# Patient Record
Sex: MALE | Race: AMERICAN INDIAN OR ALASKA NATIVE | Employment: FULL TIME | URBAN - METROPOLITAN AREA
[De-identification: names, ages, dates, MRNs, and addresses within clinical notes are randomized per-mention and may not be internally consistent; named-entity substitution may affect disease eponyms.]

---

## 2017-01-23 ENCOUNTER — HOSPITAL ENCOUNTER (OUTPATIENT)
Dept: PREADMISSION TESTING | Age: 57
Discharge: HOME | End: 2017-01-23
Attending: ORTHOPAEDIC SURGERY
Payer: COMMERCIAL

## 2017-01-23 DIAGNOSIS — M75.122 COMPLETE ROTATOR CUFF TEAR OF LEFT SHOULDER: ICD-10-CM

## 2017-01-23 DIAGNOSIS — Z01.818 PREOP EXAMINATION: ICD-10-CM

## 2017-01-23 DIAGNOSIS — M75.42 IMPINGEMENT SYNDROME OF LEFT SHOULDER: ICD-10-CM

## 2017-01-23 DIAGNOSIS — Z01.812 BLOOD TESTS PRIOR TO TREATMENT OR PROCEDURE: ICD-10-CM

## 2017-01-23 LAB
ATRIAL RATE: 78 BPM
CALCULATED P AXIS, ECG09: 63 DEGREES
CALCULATED R AXIS, ECG10: 42 DEGREES
CALCULATED T AXIS, ECG11: 52 DEGREES
DIAGNOSIS, 93000: NORMAL
HCT VFR BLD AUTO: 42 % (ref 36–48)
HGB BLD-MCNC: 14 G/DL (ref 13–16)
P-R INTERVAL, ECG05: 164 MS
POTASSIUM SERPL-SCNC: 3.3 MMOL/L (ref 3.5–5.5)
Q-T INTERVAL, ECG07: 408 MS
QRS DURATION, ECG06: 94 MS
QTC CALCULATION (BEZET), ECG08: 465 MS
VENTRICULAR RATE, ECG03: 78 BPM

## 2017-01-23 PROCEDURE — 84132 ASSAY OF SERUM POTASSIUM: CPT | Performed by: ORTHOPAEDIC SURGERY

## 2017-01-23 PROCEDURE — 93005 ELECTROCARDIOGRAM TRACING: CPT

## 2017-01-23 PROCEDURE — 36415 COLL VENOUS BLD VENIPUNCTURE: CPT | Performed by: ORTHOPAEDIC SURGERY

## 2017-01-23 PROCEDURE — 85018 HEMOGLOBIN: CPT | Performed by: ORTHOPAEDIC SURGERY

## 2017-01-24 LAB
FAX TO INFO,FAXT: NORMAL
FAX TO NUMBER,FAXN: NORMAL

## 2017-02-08 ENCOUNTER — HOSPITAL ENCOUNTER (OUTPATIENT)
Dept: PHYSICAL THERAPY | Age: 57
Discharge: HOME | End: 2017-02-08
Payer: COMMERCIAL

## 2017-02-08 PROCEDURE — 97110 THERAPEUTIC EXERCISES: CPT

## 2017-02-08 PROCEDURE — 97162 PT EVAL MOD COMPLEX 30 MIN: CPT

## 2017-02-08 NOTE — PROGRESS NOTES
PT DAILY TREATMENT NOTE 12    Patient Name: Jessie Webster  Date:2017  : 1960  [x]  Patient  Verified  Payor: BLUE CROSS / Plan: [email or web address] [facility] [place] / Product Type: PPO /    In time:3:35  Out time:4:14  Total Treatment Time (min): 39  Visit #: 1 of 10    Treatment Area: Pain in left shoulder [M25.512]    SUBJECTIVE  Pain Level (0-10 scale): 5-6  Any medication changes, allergies to medications, adverse drug reactions, diagnosis change, or new procedure performed?: [x] No    [] Yes (see summary sheet for update)  Subjective functional status/changes:   [] No changes reported  Pt is a 59year old male who presents to therapy today s/p left shoulder arthroscope [employer] [facility] DOS 2017. Pt was seen for therapy in early-mid  after having right rotator cuff repair surgery. Pt reports no complications with the surgery.  Pt reports increased pain and stiffness in the left UE.     OBJECTIVE    Modality rationale: decrease edema, decrease inflammation and decrease pain to improve the patients ability to tolerate ADLs   Min Type Additional Details    [] Estim:  []Unatt       []IFC  []Premod                        []Other:  []w/ice   []w/heat  Position:  Location:    [] Estim: []Att    []TENS instruct  []NMES                    []Other:  []w/US   []w/ice   []w/heat  Position:  Location:    []  Traction: [] Cervical       []Lumbar                       [] Prone          []Supine                       []Intermittent   []Continuous Lbs:  [] before manual  [] after manual    []  Ultrasound: []Continuous   [] Pulsed                           []1MHz   []3MHz W/cm2:  Location:    []  Iontophoresis with dexamethasone         Location: [] Take home patch   [] In clinic   10 [x]  Ice     []  heat  []  Ice massage  []  Laser   []  Anodyne Position: sitting  Location: left shoulder    []  Laser with stim  []  Other:  Position:  Location:    []  Vasopneumatic Device Pressure:       [] lo [] med [] hi   Temperature: [] lo [] med [] hi   [] Skin assessment post-treatment:  []intact []redness- no adverse reaction    []redness  adverse reaction:     21 min [x]Eval                  []Re-Eval     8 min Therapeutic Exercise:  [] See flow sheet : HEP instruction, pt education regarding anatomy and physiology of the UEs and how it relates to the pt's condition. Rationale: increase ROM and increase strength to improve the patients ability to tolerate ADLs          With   [] TE   [] TA   [] neuro   [] other: Patient Education: [x] Review HEP    [] Progressed/Changed HEP based on:   [] positioning   [] body mechanics   [] transfers   [] heat/ice application    [] other:      Other Objective/Functional Measures: See evaluation     Pain Level (0-10 scale) post treatment: 8-9    ASSESSMENT/Changes in Function: Pt given HEP handout to perform. Pt understood exercises in HEP handout. Educated pt to ice his shoulder for 10-15 mins at a time to improve pain and decrease swelling. Pt demonstrated decreased and painful PROM, impaired supine to/from sit transfers secondary to pain, and muscle tightness. Therapist was unable to measure left shoulder ER/IR PROM secondary to increased pain today. Increased pain reported with PROM left shoulder flex and therapist able to achieve ~20 degs PROM of this motion because of the increased pain. Pt reports he did not take any pain medication before therapy and last took pain medication this morning, which could have affected pt's PROM measurements. Pt would benefit from physical therapy to improve the above impairments to help the pt return to performing ADLs, functional, work and recreational activities.      Patient will continue to benefit from skilled PT services to modify and progress therapeutic interventions, address functional mobility deficits, address ROM deficits, address strength deficits, analyze and address soft tissue restrictions, analyze and cue movement patterns, analyze and modify body mechanics/ergonomics, assess and modify postural abnormalities and instruct in home and community integration to attain remaining goals. [x]  See Plan of Care  []  See progress note/recertification  []  See Discharge Summary         Progress towards goals / Updated goals:  Short Term Goals: To be accomplished in 1 weeks:  1. Pt will report compliance and independence to HEP to help the pt manage their pain and symptoms. Eval: established  Long Term Goals: To be accomplished in 5 weeks:  1. Pt will increase FOTO score to 56 points to improve ability to perform ADLs. Eval: 16 points  2. Pt will report a decrease in at worst pain to 8/10 and at best pain to 4/10 to improve activity tolerance. Eval: at worst 10/10, at best 6/10  3. Pt will increase PROM left shoulder flex to at least 90 degs, ER (at neutral ABD) to 30 degs, IR (at neutral ABD) to 50 degs to improve ability to progress through protocol with more ease. Eval: PROM flex ~20 degs, ER an IR unable to measure secondary to increased pain during evaluation.      PLAN  [x]  Upgrade activities as tolerated     [x]  Continue plan of care  [x]  Update interventions per flow sheet       []  Discharge due to:_  []  Other:_      Arlen Vazquez, PT 2/8/2017  3:50 PM    Future Appointments  Date Time Provider Neli Ragsdale   2/8/2017 3:30 PM Arlen Vazquez, PT [facility]

## 2017-02-08 NOTE — PROGRESS NOTES
[facility] at [address] [facility] [address] [facility] [facility], [address] [place] [address]  Phone: [phone]      Fax:  [phone]    Plan of Care/ Statement of Necessity for Physical Therapy Services  Patient name: Maryam De La Cruz Start of Care: 2017   Referral source: Adarsh Cao MD : 1960    Medical Diagnosis: Pain in left shoulder [M25.512]   Onset Date:DOS 2017    Treatment Diagnosis: pain in left shoulder s/p left shoulder arthroscope SAD, RCR   Prior Hospitalization: see medical history Provider#: [id number]   Medications: Verified on Patient summary List    Comorbidities: right RCR repair 3/8/2016, left TKR , HTN, arthritis   Prior Level of Function: Independent with ADLs, functional, and work tasks with pain in the left shoulder before the surgery. The Plan of Care and following information is based on the information from the initial evaluation. Assessment/ key information:   Pt is a 59year old male who presents to therapy today s/p left shoulder arthroscope SAD, RCR DOS 2017. Pt was seen for therapy in early-mid  after having right rotator cuff repair surgery. Pt reports no complications with the surgery. Pt reports increased pain and stiffness in the left UE. Pt demonstrated decreased and painful PROM, impaired supine to/from sit transfers secondary to pain, and muscle tightness. Therapist was unable to measure left shoulder ER/IR PROM secondary to increased pain today. Increased pain reported with PROM left shoulder flex and therapist able to achieve ~20 degs PROM of this motion because of the increased pain. Pt reports he did not take any pain medication before therapy and last took pain medication this morning, which could have affected pt's PROM measurements. Pt would benefit from physical therapy to improve the above impairments to help the pt return to performing ADLs, functional, work and recreational activities.      Evaluation Complexity History MEDIUM  Complexity : 1-2 comorbidities / personal factors will impact the outcome/ POC ; Examination LOW Complexity : 1-2 Standardized tests and measures addressing body structure, function, activity limitation and / or participation in recreation  ;Presentation LOW Complexity : Stable, uncomplicated  ;Clinical Decision Making HIGH Complexity : FOTO score of 1- 25   Overall Complexity Rating: MEDIUM  Problem List: pain affecting function, decrease ROM, decrease strength, edema affecting function, decrease ADL/ functional abilitiies, decrease activity tolerance, decrease flexibility/ joint mobility and decrease transfer abilities   Treatment Plan may include any combination of the following: Therapeutic exercise, Therapeutic activities, Neuromuscular re-education, Physical agent/modality, Manual therapy, Patient education, Self Care training, Functional mobility training and Home safety training  Patient / Family readiness to learn indicated by: asking questions, trying to perform skills and interest  Persons(s) to be included in education: patient (P) and family support person (FSP);list pt's wife  Barriers to Learning/Limitations: None  Patient Goal (s): complete healing  Patient Self Reported Health Status: good  Rehabilitation Potential: good    Short Term Goals: To be accomplished in 1 weeks:  1. Pt will report compliance and independence to HEP to help the pt manage their pain and symptoms. Long Term Goals: To be accomplished in 5 weeks:  1. Pt will increase FOTO score to 56 points to improve ability to perform ADLs. 2. Pt will report a decrease in at worst pain to 8/10 and at best pain to 4/10 to improve activity tolerance. 3. Pt will increase PROM left shoulder flex to at least 90 degs, ER (at neutral ABD) to 30 degs, IR (at neutral ABD) to 50 degs to improve ability to progress through protocol with more ease. Frequency / Duration: Patient to be seen 2 times per week for 5 weeks.     Patient/ Caregiver education and instruction: Diagnosis, prognosis, self care, activity modification, exercises and other sling application   [x]  Plan of care has been reviewed with ERIN Saunders, PT 2/8/2017 3:50 PM  _____________________________________________________________________  I certify that the above Therapy Services are being furnished while the patient is under my care. I agree with the treatment plan and certify that this therapy is necessary.     Physician's Signature:____________________  Date:__________Time:______    Please sign and return to [facility] at [address] [facility] [facility], [address]  Phone: [phone]      Fax:  [phone]

## 2017-02-14 ENCOUNTER — HOSPITAL ENCOUNTER (OUTPATIENT)
Dept: PHYSICAL THERAPY | Age: 57
Discharge: HOME | End: 2017-02-14
Payer: COMMERCIAL

## 2017-02-14 PROCEDURE — 97140 MANUAL THERAPY 1/> REGIONS: CPT

## 2017-02-14 PROCEDURE — 97110 THERAPEUTIC EXERCISES: CPT

## 2017-02-14 NOTE — PROGRESS NOTES
PT DAILY TREATMENT NOTE     Patient Name: Millicent Chong  Date:2017  : 1960  [x]  Patient  Verified  Payor: Marcy Morena / Plan:  [facility] / Product Type: PPO /    In time:1:00  Out time:2:00  Total Treatment Time (min): 60  Visit #: 2 of 10    Treatment Area: Pain in left shoulder [M25.512]    SUBJECTIVE  Pain Level (0-10 scale): 3/10  Any medication changes, allergies to medications, adverse drug reactions, diagnosis change, or new procedure performed?: [x] No    [] Yes (see summary sheet for update)  Subjective functional status/changes:   [] No changes reported  \"It's okay. \"    OBJECTIVE    Modality rationale: decrease inflammation and decrease pain to improve the patients ability to perform ADLs without difficulty. Min Type Additional Details    [] Estim:  []Unatt       []IFC  []Premod                        []Other:  []w/ice   []w/heat  Position:  Location:    [] Estim: []Att    []TENS instruct  []NMES                    []Other:  []w/US   []w/ice   []w/heat  Position:  Location:    []  Traction: [] Cervical       []Lumbar                       [] Prone          []Supine                       []Intermittent   []Continuous Lbs:  [] before manual  [] after manual    []  Ultrasound: []Continuous   [] Pulsed                           []1MHz   []3MHz W/cm2:  Location:    []  Iontophoresis with dexamethasone         Location: [] Take home patch   [] In clinic   10 [x]  Ice     []  heat  []  Ice massage  []  Laser   []  Anodyne Position:seated  Location:left shoulder    []  Laser with stim  []  Other:  Position:  Location:    []  Vasopneumatic Device Pressure:       [] lo [] med [] hi   Temperature: [] lo [] med [] hi   [x] Skin assessment post-treatment:  [x]intact [x]redness- no adverse reaction    []redness  adverse reaction:     35 min Therapeutic Exercise:  [] See flow sheet :   Rationale: increase ROM to improve the patients ability to perform ADls without difficulty.     15 min Manual Therapy:  PROM into flexion. Left elbow flexion   Rationale: decrease pain and increase ROM to perform ADLs without difficulty. With   [] TE   [] TA   [] neuro   [] other: Patient Education: [x] Review HEP    [] Progressed/Changed HEP based on:   [] positioning   [] body mechanics   [] transfers   [] heat/ice application    [] other:      Other Objective/Functional Measures:      Pain Level (0-10 scale) post treatment: 2/10    ASSESSMENT/Changes in Function: Initiated ex. Per flow sheet. Pt reported some discomfort with manual but reported a decrease in p! After therapy. Patient will continue to benefit from skilled PT services to modify and progress therapeutic interventions, address functional mobility deficits, address ROM deficits, address strength deficits and analyze and address soft tissue restrictions to attain remaining goals. []  See Plan of Care  []  See progress note/recertification  []  See Discharge Summary         Progress towards goals / Updated goals:  Short Term Goals: To be accomplished in 1 weeks:  1. Pt will report compliance and independence to HEP to help the pt manage their pain and symptoms. Eval: established  Long Term Goals: To be accomplished in 5 weeks:  1. Pt will increase FOTO score to 56 points to improve ability to perform ADLs. Eval: 16 points  2. Pt will report a decrease in at worst pain to 8/10 and at best pain to 4/10 to improve activity tolerance. Eval: at worst 10/10, at best 6/10  3. Pt will increase PROM left shoulder flex to at least 90 degs, ER (at neutral ABD) to 30 degs, IR (at neutral ABD) to 50 degs to improve ability to progress through protocol with more ease.   Eval: PROM flex ~20 degs, ER an IR unable to measure secondary to increased pain during evaluation.        PLAN  [x]  Upgrade activities as tolerated     [x]  Continue plan of care  []  Update interventions per flow sheet       []  Discharge due to:_  []  Other:_      Anali Villela ERIN Huffman 2/14/2017  1:19 PM    Future Appointments  Date Time Provider Neli Melyssa   2/16/2017 3:00 PM Rickynaty Muñoz, PTA [facility]   2/21/2017 3:00 PM Ricky Toni, PTA [facility]   2/23/2017 3:00 PM Shabbir Ayala, PT [facility]   2/28/2017 3:30 PM Ricky Matthewua, PTA [facility]   3/7/2017 3:00 PM Ricky Matthewua, PTA [facility]   3/9/2017 3:00 PM Ricky Matthewua, PTA [facility]   3/14/2017 3:00 PM Ricky [place], PTA [facility]   3/16/2017 3:00 PM Ricky [place], PTA [facility]   3/21/2017 3:00 PM Ricky Chidevin[place]ua, PTA [facility]   3/23/2017 3:00 PM Ricky Chidevin[place]ua, PTA [facility]   3/28/2017 3:00 PM Ricky Chidevin[place]ua, PTA [facility]   3/30/2017 3:00 PM Ricky Mikal[place]ua, PTA [facility]

## 2017-02-16 ENCOUNTER — HOSPITAL ENCOUNTER (OUTPATIENT)
Dept: PHYSICAL THERAPY | Age: 57
Discharge: HOME | End: 2017-02-16
Payer: COMMERCIAL

## 2017-02-16 PROCEDURE — 97140 MANUAL THERAPY 1/> REGIONS: CPT

## 2017-02-16 PROCEDURE — 97110 THERAPEUTIC EXERCISES: CPT

## 2017-02-16 NOTE — PROGRESS NOTES
PT DAILY TREATMENT NOTE     Patient Name: Milton Bradley  Date:2017  : 1960  [x]  Patient  Verified  Payor: BLUE CROSS / Plan: [facility] / Product Type: PPO /    In time:3:00  Out time:3:50  Total Treatment Time (min): 50  Visit #: 3 of 10    Treatment Area: Pain in left shoulder [M25.512]    SUBJECTIVE  Pain Level (0-10 scale): 2/10  Any medication changes, allergies to medications, adverse drug reactions, diagnosis change, or new procedure performed?: [x] No    [] Yes (see summary sheet for update)  Subjective functional status/changes:   [] No changes reported  \"I was really sore after I left the other day. It's better today. \"    OBJECTIVE    Modality rationale: decrease inflammation and decrease pain to improve the patients ability to perform ADLs without difficulty.    Min Type Additional Details    [] Estim:  []Unatt       []IFC  []Premod                        []Other:  []w/ice   []w/heat  Position:  Location:    [] Estim: []Att    []TENS instruct  []NMES                    []Other:  []w/US   []w/ice   []w/heat  Position:  Location:    []  Traction: [] Cervical       []Lumbar                       [] Prone          []Supine                       []Intermittent   []Continuous Lbs:  [] before manual  [] after manual    []  Ultrasound: []Continuous   [] Pulsed                           []1MHz   []3MHz W/cm2:  Location:    []  Iontophoresis with dexamethasone         Location: [] Take home patch   [] In clinic   10 [x]  Ice     []  heat  []  Ice massage  []  Laser   []  Anodyne Position:seated  Location:left shoulder    []  Laser with stim  []  Other:  Position:  Location:    []  Vasopneumatic Device Pressure:       [] lo [] med [] hi   Temperature: [] lo [] med [] hi   [x] Skin assessment post-treatment:  [x]intact [x]redness- no adverse reaction    []redness  adverse reaction:     30 min Therapeutic Exercise:  [] See flow sheet :   Rationale: increase ROM to improve the patients ability to perform ADLs without difficulty. 10 min Manual Therapy:  PROM into flexion. Left elbow flexion   Rationale: decrease pain, increase ROM and increase tissue extensibility to perform ADLs without difficulty. With   [] TE   [] TA   [] neuro   [] other: Patient Education: [x] Review HEP    [] Progressed/Changed HEP based on:   [] positioning   [] body mechanics   [] transfers   [] heat/ice application    [] other:      Other Objective/Functional Measures:      Pain Level (0-10 scale) post treatment: 4/10    ASSESSMENT/Changes in Function: Continued with current ex. Per protocol. Pt continues to report increased p! With manual but is able to tolerate. PROM is limited by pain. Patient will continue to benefit from skilled PT services to modify and progress therapeutic interventions, address functional mobility deficits and address ROM deficits to attain remaining goals. []  See Plan of Care  []  See progress note/recertification  []  See Discharge Summary         Progress towards goals / Updated goals:  Short Term Goals: To be accomplished in 1 weeks:  1. Pt will report compliance and independence to HEP to help the pt manage their pain and symptoms. Eval: established  Current: MET. Pt reports compliance. Long Term Goals: To be accomplished in 5 weeks:  1. Pt will increase FOTO score to 56 points to improve ability to perform ADLs. Eval: 16 points  2. Pt will report a decrease in at worst pain to 8/10 and at best pain to 4/10 to improve activity tolerance. Eval: at worst 10/10, at best 6/10  3. Pt will increase PROM left shoulder flex to at least 90 degs, ER (at neutral ABD) to 30 degs, IR (at neutral ABD) to 50 degs to improve ability to progress through protocol with more ease. Eval: PROM flex ~20 degs, ER an IR unable to measure secondary to increased pain during evaluation.      PLAN  [x]  Upgrade activities as tolerated     [x]  Continue plan of care  []  Update interventions per flow sheet       []  Discharge due to:_  []  Other:_      Nellydomonique Betty ERIN 2/16/2017  4:50 PM    Future Appointments  Date Time Provider Neli Ragsdale   2/21/2017 3:00 PM Thomas Hernández, ERIN [facility]   2/23/2017 3:00 PM Laura Noel, PT [facility]   2/27/2017 3:30 PM Laura Noel, PT [facility]   3/2/2017 3:30 PM Thomas Hernández, ERIN [facility]   3/7/2017 3:30 PM Thomas Hernández, ERIN [facility]   3/9/2017 3:30 PM Thomas Hernández, ERIN [facility]   3/14/2017 3:30 PM Thomas Hernández, ERIN [facility]   3/16/2017 3:30 PM Thomas Hernández, ERIN [facility]   3/21/2017 3:30 PM Thomas Hernández, ERIN [facility]   3/23/2017 3:30 PM Thomas Hernández, ERIN [facility]   3/28/2017 3:30 PM Thomas Hernándze, ERIN [facility]   3/30/2017 3:30 PM Thomas Hernández, ERIN [facility]

## 2017-02-21 ENCOUNTER — HOSPITAL ENCOUNTER (OUTPATIENT)
Dept: PHYSICAL THERAPY | Age: 57
Discharge: HOME | End: 2017-02-21
Payer: COMMERCIAL

## 2017-02-21 PROCEDURE — 97110 THERAPEUTIC EXERCISES: CPT

## 2017-02-21 PROCEDURE — 97112 NEUROMUSCULAR REEDUCATION: CPT

## 2017-02-21 NOTE — PROGRESS NOTES
PT DAILY TREATMENT NOTE     Patient Name: Brenda Kidd  Date:2017  : 1960  [x]  Patient  Verified  Payor: BLUE CROSS / Plan:  [facility] [place] [place] / Product Type: PPO /    In time:2:55  Out time:3:35  Total Treatment Time (min): 40  Visit #: 4 of 10    Treatment Area: Pain in left shoulder [M25.512]    SUBJECTIVE  Pain Level (0-10 scale): 0/10  Any medication changes, allergies to medications, adverse drug reactions, diagnosis change, or new procedure performed?: [x] No    [] Yes (see summary sheet for update)  Subjective functional status/changes:   [] No changes reported  \"I'm okay. \"    OBJECTIVE    Modality rationale: decrease inflammation and decrease pain to improve the patients ability to perform ADls without difficulty.    Min Type Additional Details    [] Estim:  []Unatt       []IFC  []Premod                        []Other:  []w/ice   []w/heat  Position:  Location:    [] Estim: []Att    []TENS instruct  []NMES                    []Other:  []w/US   []w/ice   []w/heat  Position:  Location:    []  Traction: [] Cervical       []Lumbar                       [] Prone          []Supine                       []Intermittent   []Continuous Lbs:  [] before manual  [] after manual    []  Ultrasound: []Continuous   [] Pulsed                           []1MHz   []3MHz W/cm2:  Location:    []  Iontophoresis with dexamethasone         Location: [] Take home patch   [] In clinic   10 [x]  Ice     []  heat  []  Ice massage  []  Laser   []  Anodyne Position:seated  Location:left shoulder    []  Laser with stim  []  Other:  Position:  Location:    []  Vasopneumatic Device Pressure:       [] lo [] med [] hi   Temperature: [] lo [] med [] hi   [x] Skin assessment post-treatment:  [x]intact [x]redness- no adverse reaction    []redness  adverse reaction:  30 min Therapeutic Exercise:  [] See flow sheet :   Rationale: increase ROM and increase strength to improve the patients ability to perform ADLs without difficult. y  10 min Manual Therapy:  PROM into flexion, IR/ER right S/L scap mobs. Rationale: decrease pain, increase ROM and increase tissue extensibility to perform ADls without difficulty. With   [] TE   [] TA   [] neuro   [] other: Patient Education: [x] Review HEP    [] Progressed/Changed HEP based on:   [] positioning   [] body mechanics   [] transfers   [] heat/ice application    [] other:      Other Objective/Functional Measures:      Pain Level (0-10 scale) post treatment: 4-5/10    ASSESSMENT/Changes in Function: Continued with current ex. Per flow sheet. May add pulley flexion next visit. Pt reported increased pain during manual.    Patient will continue to benefit from skilled PT services to modify and progress therapeutic interventions, address functional mobility deficits, address ROM deficits and address strength deficits to attain remaining goals. []  See Plan of Care  []  See progress note/recertification  []  See Discharge Summary         Progress towards goals / Updated goals:  Short Term Goals: To be accomplished in 1 weeks:  1. Pt will report compliance and independence to HEP to help the pt manage their pain and symptoms. Eval: established  Current: MET. Pt reports compliance. Long Term Goals: To be accomplished in 5 weeks:  1. Pt will increase FOTO score to 56 points to improve ability to perform ADLs. Eval: 16 points  2. Pt will report a decrease in at worst pain to 8/10 and at best pain to 4/10 to improve activity tolerance. Eval: at worst 10/10, at best 6/10  Current: at worst 7/10, at best 2/10  3. Pt will increase PROM left shoulder flex to at least 90 degs, ER (at neutral ABD) to 30 degs, IR (at neutral ABD) to 50 degs to improve ability to progress through protocol with more ease. Eval: PROM flex ~20 degs, ER an IR unable to measure secondary to increased pain during evaluation. Current: Progressing.  PROM is limited by pain, but patient is able to move into IR/ER.       PLAN  [x]  Upgrade activities as tolerated     [x]  Continue plan of care  []  Update interventions per flow sheet       []  Discharge due to:_  []  Other:_      Missy Soto PTA 2/21/2017  3:33 PM    Future Appointments  Date Time Provider Neli Ragsdale   2/23/2017 3:00 PM Jyoti Ledesma, PT [facility]   2/27/2017 3:30 PM Jyoti Ledesma, PT [facility]   3/2/2017 3:30 PM Missy Soto, ERIN [facility]   3/7/2017 3:30 PM Missy Soto, ERIN [facility]   3/9/2017 3:30 PM Missy Soto, ERIN [facility]   3/14/2017 3:30 PM Missy Soto, ERIN [facility]   3/16/2017 3:30 PM Missy Soto, ERIN [facility]   3/21/2017 3:30 PM Missy Soto, ERIN [facility]   3/23/2017 3:30 PM Missy Soto, PTA [facility]   3/28/2017 3:30 PM Missy Soto, PTA [facility]   3/30/2017 3:30 PM Missy Soto, ERIN [facility]

## 2017-02-23 ENCOUNTER — HOSPITAL ENCOUNTER (OUTPATIENT)
Dept: PHYSICAL THERAPY | Age: 57
Discharge: HOME | End: 2017-02-23
Payer: COMMERCIAL

## 2017-02-23 PROCEDURE — 97110 THERAPEUTIC EXERCISES: CPT

## 2017-02-23 PROCEDURE — 97140 MANUAL THERAPY 1/> REGIONS: CPT

## 2017-02-23 NOTE — PROGRESS NOTES
PT DAILY TREATMENT NOTE     Patient Name: Lori Estrada  Date:2017  : 1960  [x]  Patient  Verified  Payor: BLUE CROSS / Plan:  [facility] [place] / Product Type: PPO /    In time: 3:00  Out time: 3:50  Total Treatment Time (min): 50  Visit #: 5 of 10    Treatment Area: Pain in left shoulder [M25.512]    SUBJECTIVE  Pain Level (0-10 scale):  2  Any medication changes, allergies to medications, adverse drug reactions, diagnosis change, or new procedure performed?: [x] No    [] Yes (see summary sheet for update)  Subjective functional status/changes:   [] No changes reported  \"No changes reported    OBJECTIVE    Modality rationale: decrease inflammation and decrease pain to improve the patients ability to perform ADls   Min Type Additional Details    [] Estim:  []Unatt       []IFC  []Premod                        []Other:  []w/ice   []w/heat  Position:  Location:    [] Estim: []Att    []TENS instruct  []NMES                    []Other:  []w/US   []w/ice   []w/heat  Position:  Location:    []  Traction: [] Cervical       []Lumbar                       [] Prone          []Supine                       []Intermittent   []Continuous Lbs:  [] before manual  [] after manual    []  Ultrasound: []Continuous   [] Pulsed                           []1MHz   []3MHz W/cm2:  Location:    []  Iontophoresis with dexamethasone         Location: [] Take home patch   [] In clinic   10 [x]  Ice     []  heat  []  Ice massage  []  Laser   []  Anodyne Position:seated  Location:left shoulder    []  Laser with stim  []  Other:  Position:  Location:    []  Vasopneumatic Device Pressure:       [] lo [] med [] hi   Temperature: [] lo [] med [] hi   [x] Skin assessment post-treatment:  [x]intact [x]redness- no adverse reaction    []redness  adverse reaction:    25 min Therapeutic Exercise:  [x] See flow sheet :   Rationale: increase ROM and increase strength to improve the patients ability to perform ADLs    15 min Manual Therapy: PROM into flexion and gentle ER, gentle left shoulder long axis distraction   Rationale: decrease pain, increase ROM and increase tissue extensibility to improve ADL tolerance          With   [] TE   [] TA   [] neuro   [] other: Patient Education: [x] Review HEP    [] Progressed/Changed HEP based on:   [] positioning   [] body mechanics   [] transfers   [] heat/ice application    [] other:      Other Objective/Functional Measures: PROM left shoulder flex ~20-25 degs during manual interventions. Attempted wand supine shoulder flex and pt was unable to perform secondary to pain. FOTO: 37 points (increase in 21 points since evaluation)    Pain Level (0-10 scale) post treatment: 4-5    ASSESSMENT/Changes in Function: Over the past few sessions, pt has reported no to mild pain pre-session but is limited by pain during manual interventions. Pt states he is taking aleve for the pain. Pt reports he has an MD appointment next week. Therapist will call MD's office regarding this limited PROM and pain. Continue POC as tolerated. Patient will continue to benefit from skilled PT services to modify and progress therapeutic interventions, address functional mobility deficits, address ROM deficits and address strength deficits to attain remaining goals. []  See Plan of Care  []  See progress note/recertification  []  See Discharge Summary         Progress towards goals / Updated goals:  Short Term Goals: To be accomplished in 1 weeks:  1. Pt will report compliance and independence to HEP to help the pt manage their pain and symptoms. Eval: established  Current: MET. Pt reports compliance. Long Term Goals: To be accomplished in 5 weeks:  1. Pt will increase FOTO score to 56 points to improve ability to perform ADLs. Eval: 16 points  Current: 37 points (increase in 21 points since evaluation) 2/23/2017  2. Pt will report a decrease in at worst pain to 8/10 and at best pain to 4/10 to improve activity tolerance. Eval: at worst 10/10, at best 6/10  Current: at worst 7/10, at best 2/10  3. Pt will increase PROM left shoulder flex to at least 90 degs, ER (at neutral ABD) to 30 degs, IR (at neutral ABD) to 50 degs to improve ability to progress through protocol with more ease. Eval: PROM flex ~20 degs, ER an IR unable to measure secondary to increased pain during evaluation.    Current: PROM flex ~20-25 degs (visual observation) and continues to be limited by pain 2/23/2017     PLAN  [x]  Upgrade activities as tolerated     [x]  Continue plan of care  [x]  Update interventions per flow sheet       []  Discharge due to:_  []  Other:_      Jyoti Ledesma, PT 2/23/2017  3:44 PM    Future Appointments  Date Time Provider Neli Ragsdale   2/23/2017 3:00 PM Jyoti Ledesma, PT [facility]   2/27/2017 3:30 PM Jyoti Ledesma, PT [facility]   3/2/2017 3:30 PM Missy Soto, ERIN [facility]   3/7/2017 3:30 PM Missy Soto, ERIN [facility]   3/9/2017 3:30 PM Missy Soto, ERIN [facility]   3/14/2017 3:30 PM Missy Soto, ERIN [facility]   3/16/2017 3:30 PM Missy Soto, ERIN [facility]   3/21/2017 3:30 PM Missy Soto, ERIN [facility]   3/23/2017 3:30 PM Missy Soto PTA [facility]   3/28/2017 3:30 PM Missy Soto PTA [facility]   3/30/2017 3:30 PM Missy Soto PTA [facility]

## 2017-02-27 ENCOUNTER — HOSPITAL ENCOUNTER (OUTPATIENT)
Dept: PHYSICAL THERAPY | Age: 57
End: 2017-02-27
Payer: COMMERCIAL

## 2017-02-28 ENCOUNTER — APPOINTMENT (OUTPATIENT)
Dept: PHYSICAL THERAPY | Age: 57
End: 2017-02-28
Payer: COMMERCIAL

## 2017-03-02 ENCOUNTER — HOSPITAL ENCOUNTER (OUTPATIENT)
Dept: PHYSICAL THERAPY | Age: 57
Discharge: HOME | End: 2017-03-02
Payer: COMMERCIAL

## 2017-03-02 PROCEDURE — 97110 THERAPEUTIC EXERCISES: CPT

## 2017-03-02 PROCEDURE — 97140 MANUAL THERAPY 1/> REGIONS: CPT

## 2017-03-02 NOTE — PROGRESS NOTES
PT DAILY TREATMENT NOTE     Patient Name: Ana Free  Date:3/2/2017  : 1960  [x]  Patient  Verified  Payor: BLUE CROSS / Plan: [employer] [place] [address] [place] / Product Type: PPO /    In time:3:30  Out time:4:10  Total Treatment Time (min): 40  Visit #: 6 of 10    Treatment Area: Pain in left shoulder [M25.512]    SUBJECTIVE  Pain Level (0-10 scale): 2/10  Any medication changes, allergies to medications, adverse drug reactions, diagnosis change, or new procedure performed?: [x] No    [] Yes (see summary sheet for update)  Subjective functional status/changes:   [] No changes reported      OBJECTIVE    Modality rationale: decrease inflammation and decrease pain to improve the patients ability to perform ADLs without difficulty. Min Type Additional Details    [] Estim:  []Unatt       []IFC  []Premod                        []Other:  []w/ice   []w/heat  Position:  Location:    [] Estim: []Att    []TENS instruct  []NMES                    []Other:  []w/US   []w/ice   []w/heat  Position:  Location:    []  Traction: [] Cervical       []Lumbar                       [] Prone          []Supine                       []Intermittent   []Continuous Lbs:  [] before manual  [] after manual    []  Ultrasound: []Continuous   [] Pulsed                           []1MHz   []3MHz W/cm2:  Location:    []  Iontophoresis with dexamethasone         Location: [] Take home patch   [] In clinic   10 [x]  Ice     []  heat  []  Ice massage  []  Laser   []  Anodyne Position: seated  Location:left shoulder    []  Laser with stim  []  Other:  Position:  Location:    []  Vasopneumatic Device Pressure:       [] lo [] med [] hi   Temperature: [] lo [] med [] hi   [x] Skin assessment post-treatment:  [x]intact [x]redness- no adverse reaction    []redness  adverse reaction:   15 min Therapeutic Exercise:  [] See flow sheet :   Rationale: increase ROM to improve the patients ability to perform ADLs without difficulty.     15 min Manual Therapy:  Left scap mobs, TrP release to left UTs in right S/L. Rationale: decrease pain, increase ROM, increase tissue extensibility and decrease trigger points to perform ADls without difficulty. With   [] TE   [] TA   [] neuro   [] other: Patient Education: [x] Review HEP    [] Progressed/Changed HEP based on:   [] positioning   [] body mechanics   [] transfers   [] heat/ice application    [] other:      Other Objective/Functional Measures:      Pain Level (0-10 scale) post treatment: 5/10    ASSESSMENT/Changes in Function: Received update from patient on the comment that was made from pt. to MD's nurse \"I did an exercise like I was flapping like a bird\"  Pt clarified and said he meant during manual when therapist performed ossolasions, it increased his pain. Educated patient on motions we are allowed by MD request to perform, during manual.   Therapist focused on scap mobs and TrP release  To left scap and UT. Pt reported feeling relaxed during manual, but an increase in p! After therapy. Patient will continue to benefit from skilled PT services to modify and progress therapeutic interventions, address functional mobility deficits and address ROM deficits to attain remaining goals. []  See Plan of Care  []  See progress note/recertification  []  See Discharge Summary         Progress towards goals / Updated goals:  Short Term Goals: To be accomplished in 1 weeks:  1. Pt will report compliance and independence to HEP to help the pt manage their pain and symptoms. Eval: established  Current: MET. Pt reports compliance. Long Term Goals: To be accomplished in 5 weeks:  1. Pt will increase FOTO score to 56 points to improve ability to perform ADLs. Eval: 16 points  Current: 37 points (increase in 21 points since evaluation) 2/23/2017  2. Pt will report a decrease in at worst pain to 8/10 and at best pain to 4/10 to improve activity tolerance.    Eval: at worst 10/10, at best 6/10  Current: at worst 7/10, at best 2/10  3. Pt will increase PROM left shoulder flex to at least 90 degs, ER (at neutral ABD) to 30 degs, IR (at neutral ABD) to 50 degs to improve ability to progress through protocol with more ease. Eval: PROM flex ~20 degs, ER an IR unable to measure secondary to increased pain during evaluation.    Current: PROM flex ~20-25 degs (visual observation) and continues to be limited by pain 2/23/2017    PLAN  []  Upgrade activities as tolerated     [x]  Continue plan of care  []  Update interventions per flow sheet       []  Discharge due to:_  []  Other:_      Maxine Hernandez PTA 3/2/2017  3:39 PM    Future Appointments  Date Time Provider Neli Ragsdale   3/7/2017 3:30 PM Maxine Hernandez PTA [facility]   3/9/2017 3:30 PM Maxine Presser, ERIN [facility]   3/14/2017 3:30 PM Maxine Presser, ERIN [facility]   3/16/2017 3:30 PM Maxine Presser, ERIN [facility]   3/21/2017 3:30 PM Maxine Presser, ERIN [facility]   3/23/2017 3:30 PM Maxine Presser, PTA [facility]   3/28/2017 3:30 PM Maxine Presser, PTA [facility]   3/30/2017 3:30 PM Maxine Presser, PTA [facility]

## 2017-03-07 ENCOUNTER — APPOINTMENT (OUTPATIENT)
Dept: PHYSICAL THERAPY | Age: 57
End: 2017-03-07
Payer: COMMERCIAL

## 2017-03-09 ENCOUNTER — HOSPITAL ENCOUNTER (OUTPATIENT)
Dept: PHYSICAL THERAPY | Age: 57
Discharge: HOME | End: 2017-03-09
Payer: COMMERCIAL

## 2017-03-09 PROCEDURE — 97110 THERAPEUTIC EXERCISES: CPT

## 2017-03-09 PROCEDURE — 97140 MANUAL THERAPY 1/> REGIONS: CPT

## 2017-03-09 NOTE — PROGRESS NOTES
PT DAILY TREATMENT NOTE     Patient Name: Jason Silver  Date:3/9/2017  : 1960  [x]  Patient  Verified  Payor: BLUE CROSS / Plan: [employer] [facility] [place] / Product Type: PPO /    In time:3:30  Out time:4:30  Total Treatment Time (min): 60  Visit #: 7 of 10    Treatment Area: Pain in left shoulder [M25.512]    SUBJECTIVE  Pain Level (0-10 scale): 2/10  Any medication changes, allergies to medications, adverse drug reactions, diagnosis change, or new procedure performed?: [x] No    [] Yes (see summary sheet for update)  Subjective functional status/changes:   [x] No changes reported      OBJECTIVE    Modality rationale: decrease inflammation and decrease pain to improve the patients ability to perform ADLs without difficulty. Min Type Additional Details    [] Estim:  []Unatt       []IFC  []Premod                        []Other:  []w/ice   []w/heat  Position:  Location:    [] Estim: []Att    []TENS instruct  []NMES                    []Other:  []w/US   []w/ice   []w/heat  Position:  Location:    []  Traction: [] Cervical       []Lumbar                       [] Prone          []Supine                       []Intermittent   []Continuous Lbs:  [] before manual  [] after manual    []  Ultrasound: []Continuous   [] Pulsed                           []1MHz   []3MHz W/cm2:  Location:    []  Iontophoresis with dexamethasone         Location: [] Take home patch   [] In clinic   10 [x]  Ice     []  heat  []  Ice massage  []  Laser   []  Anodyne Position:seated  Location:left sh.    []  Laser with stim  []  Other:  Position:  Location:    []  Vasopneumatic Device Pressure:       [] lo [] med [] hi   Temperature: [] lo [] med [] hi   [x] Skin assessment post-treatment:  [x]intact [x]redness- no adverse reaction    []redness  adverse reaction:    35 min Therapeutic Exercise:  [] See flow sheet :   Rationale: increase ROM to improve the patients ability to perform ADLs without difficulty.     15 min Manual Therapy: Left scap mobs, TrP release to left UTs in right S/L, SOR in supine. Rationale: decrease pain, increase ROM, increase tissue extensibility and decrease trigger points to perform ADls without difficulty. With   [] TE   [] TA   [] neuro   [] other: Patient Education: [x] Review HEP    [] Progressed/Changed HEP based on:   [] positioning   [] body mechanics   [] transfers   [] heat/ice application    [] other:      Other Objective/Functional Measures:      Pain Level (0-10 scale) post treatment: 4/10    ASSESSMENT/Changes in Function: Continued with ex. Per flow sheet. Pt reported discomfort with ex. And manual but was able to perform. Patient will continue to benefit from skilled PT services to modify and progress therapeutic interventions, address functional mobility deficits, address ROM deficits and address strength deficits to attain remaining goals. []  See Plan of Care  []  See progress note/recertification  []  See Discharge Summary         Progress towards goals / Updated goals:  Short Term Goals: To be accomplished in 1 weeks:  1. Pt will report compliance and independence to HEP to help the pt manage their pain and symptoms. Eval: established  Current: MET. Pt reports compliance. Long Term Goals: To be accomplished in 5 weeks:  1. Pt will increase FOTO score to 56 points to improve ability to perform ADLs. Eval: 16 points  Current: 37 points (increase in 21 points since evaluation) 2/23/2017  2. Pt will report a decrease in at worst pain to 8/10 and at best pain to 4/10 to improve activity tolerance. Eval: at worst 10/10, at best 6/10  Current: at worst 7/10, at best 2/10  3. Pt will increase PROM left shoulder flex to at least 90 degs, ER (at neutral ABD) to 30 degs, IR (at neutral ABD) to 50 degs to improve ability to progress through protocol with more ease. Eval: PROM flex ~20 degs, ER an IR unable to measure secondary to increased pain during evaluation.    Current: PROM flex ~20-25 degs (visual observation) and continues to be limited by pain 2/23/2017       PLAN  [x]  Upgrade activities as tolerated     [x]  Continue plan of care  []  Update interventions per flow sheet       []  Discharge due to:_  []  Other:_      Celina Aguirre PTA 3/9/2017  4:50 PM    Future Appointments  Date Time Provider Neli Ragsdale   3/13/2017 3:30 PM Joshua Sarabiapool, PT [facility]16/2017 3:30 PM Celina Aguirre PTA [facility]   3/21/2017 3:30 PM Celina Aguirre PTA [facility]   3/23/2017 3:30 PM Celina Aguirre PTA [facility]   3/28/2017 3:30 PM Celina Aguirre PTA [facility]   3/30/2017 3:30 PM Celina Aguirre PTA [facility]   4/4/2017 3:30 PM Celina Aguirre PTA [facility]   4/6/2017 3:30 PM Celina Aguirre PTA [facility]   4/11/2017 3:30 PM Celina Aguirre PTA [facility]   4/13/2017 3:30 PM Celina Aguirre PTA [facility]   4/18/2017 3:30 PM Celina Aguirre PTA [facility]   4/20/2017 3:30 PM Celina Aguirre PTA [facility]   4/25/2017 3:30 PM Celina Aguirre PTA [facility]   4/27/2017 3:30 PM Celina Aguirre PTA [facility]

## 2017-03-13 ENCOUNTER — HOSPITAL ENCOUNTER (OUTPATIENT)
Dept: PHYSICAL THERAPY | Age: 57
Discharge: HOME | End: 2017-03-13
Payer: COMMERCIAL

## 2017-03-13 PROCEDURE — 97162 PT EVAL MOD COMPLEX 30 MIN: CPT

## 2017-03-13 PROCEDURE — 97110 THERAPEUTIC EXERCISES: CPT

## 2017-03-13 NOTE — PROGRESS NOTES
PT DAILY TREATMENT NOTE     Patient Name: Lori Estrada  Date:3/13/2017  : 1960  [x]  Patient  Verified  Payor: BLUE CROSS / Plan: [facility] / Product Type: PPO /    In time:3:40  Out time:4:28  Total Treatment Time (min): 48  Visit #: 1 of 8    Treatment Area: Dorsalgia, unspecified [M54.9]    SUBJECTIVE  Pain Level (0-10 scale): 1-2  Any medication changes, allergies to medications, adverse drug reactions, diagnosis change, or new procedure performed?: [x] No    [] Yes (see summary sheet for update)  Subjective functional status/changes:   [] No changes reported  Pt is a 59year old male who presents to therapy today with low back pain and neck pain. Pt states that his symptoms began about 3 weeks ago \"after I had the left RTC surgery\". Pt is currently being seen for therapy s/p left rotator cuff repair. Pt reports increased pain with walking and upon standing, pain with back EXT and rotation. Pt demonstrated decreased AROM, pain with palpation, increased pain upon standing from plinth. Pt had increased difficulty with right hip flex AROM in reclined position versus while in sitting (MMT right hip flex in sitting 4+/5). PROM right hip flex and ABD in reclined WNL with no pain    OBJECTIVE    38 min [x]Eval                  []Re-Eval     10 min Therapeutic Exercise:  [] See flow sheet : HEP instruction and demonstration, pt education regarding anatomy and physiology of the LBP and c/s and how it relates to the pt's condition.     Rationale: increase ROM and increase strength to improve the patients ability to tolerate ADLs        With   [] TE   [] TA   [] neuro   [] other: Patient Education: [x] Review HEP    [] Progressed/Changed HEP based on:   [] positioning   [] body mechanics   [] transfers   [] heat/ice application    [] other:      Other Objective/Functional Measures: See evaluation     Pain Level (0-10 scale) post treatment: 2    ASSESSMENT/Changes in Function: Pt given HEP handout to perform. Pt understood exercises in HEP handout. Pt may be limited in some interventions secondary to pt's left shoulder precautions due to his RTC surgery. Pt denies numbness/tingling in the LEs. Pt's neck symptoms seem to be correlated to pt's left shoulder surgery. We will focus this POC on the pt's lower back at this time because of this and will add neck/cervical spine exercises when pt attends therapy for his left shoulder. Pt would benefit from physical therapy to improve the above impairments to help the pt return to performing ADLs and functional activities. Patient will continue to benefit from skilled PT services to modify and progress therapeutic interventions, address functional mobility deficits, address ROM deficits, address strength deficits, analyze and address soft tissue restrictions, analyze and cue movement patterns, analyze and modify body mechanics/ergonomics, assess and modify postural abnormalities and instruct in home and community integration to attain remaining goals. [x]  See Plan of Care  []  See progress note/recertification  []  See Discharge Summary         Progress towards goals / Updated goals:  Short Term Goals: To be accomplished in 1 weeks:  1. Pt will report compliance and independence to HEP to help the pt manage their pain and symptoms. Eval: Established   Long Term Goals: To be accomplished in 4 weeks:  1. Pt will report minimal to no pain in the low back upon standing from a chair to improve ease of transfers. Eval: reports increased LBP upon standing   2. Pt will increase AROM lumbar EXT to % of WNL, right SB and rotation to WNL with minimal to no pain to improve ability to tolerate functional tasks. Eval: 50-75% of WNL with increased pressure/LBP  3. Pt will report being able to walk for 10 mins with minimal to no increase in low back pain to improve walking ability in the community.   Eval: reports LBP immediately with walking    PLAN  [x] Upgrade activities as tolerated     [x]  Continue plan of care  [x]  Update interventions per flow sheet       []  Discharge due to:_  []  Other:_      Timbo Lees, PT 3/13/2017  3:44 PM    Future Appointments  Date Time Provider Neli Ragsdale   3/13/2017 3:30 PM Timbo Lees, PT [facility]   3/16/2017 3:30 PM Kaveh Krishnan, PTA [facility]   3/21/2017 3:30 PM Kaveh Krishnan, PTA [facility]   3/23/2017 3:30 PM Kaveh Krishnan, PTA [facility]   3/28/2017 3:30 PM Kaveh Krishnan, PTA [facility]   3/30/2017 3:30 PM Kaveh Longoriayr, PTA [facility]   4/4/2017 3:30 PM Kaveh Longoriayr, PTA [facility]   4/6/2017 3:30 PM Kaveh Longoriayr, PTA [facility]   4/11/2017 3:30 PM Kaveh Longoriayr, PTA [facility]   4/13/2017 3:30 PM Kaveh Longoriayr, PTA [facility]   4/18/2017 3:30 PM Kaveh Krishnan, PTA [facility]   4/20/2017 3:30 PM Kaveh [facility]oriayr, [facility]   4/25/2017 3:30 PM Kaveh Krishnan PTA [facility]   4/27/2017 3:30 PM Kaveh Krishnan PTA [facility]

## 2017-03-13 NOTE — PROGRESS NOTES
[facility] at [address] [facility], [address] [place] [address]  Phone: [phone]      Fax:  [phone]    Plan of Care/ Statement of Necessity for Physical Therapy Services  Patient name: Pete Quan Start of Care: 3/13/2017   Referral source: Prosper Gilman MD : 1960    Medical Diagnosis: Dorsalgia, unspecified [M54.9]   Onset Date: 3 weeks ago    Treatment Diagnosis: Low back pain, neck pain   Prior Hospitalization: see medical history Provider#: [id number]   Medications: Verified on Patient summary List   Comorbidities: right RCR repair 3/8/2016, left TKR , HTN, arthritis, left RTC repair DOS 2017  Prior Level of Function: Independent with ADLs, functional, and work tasks with pain in the left shoulder before the surgery. The Plan of Care and following information is based on the information from the initial evaluation. Assessment/ key information:   Pt is a 59year old male who presents to therapy today with low back pain and neck pain. Pt states that his symptoms began about 3 weeks ago \"after I had the left RTC surgery\". Pt is currently being seen for therapy s/p left rotator cuff repair. Pt reports increased pain with walking and upon standing, pain with back EXT and rotation. Pt demonstrated decreased AROM, pain with palpation, increased pain upon standing from plinth. Pt had increased difficulty with right hip flex AROM in reclined position versus while in sitting (MMT right hip flex in sitting 4+/5). PROM right hip flex and ABD in reclined WNL with no pain. Pt denies numbness/tingling in the LEs. Pt's neck symptoms seem to be correlated to pt's left shoulder surgery. We will focus this POC on the pt's lower back at this time because of this and will add neck/cervical spine exercises when pt attends therapy for his left shoulder.  Pt would benefit from physical therapy to improve the above impairments to help the pt return to performing ADLs and functional activities. Evaluation Complexity History MEDIUM  Complexity : 1-2 comorbidities / personal factors will impact the outcome/ POC ; Examination MEDIUM Complexity : 3 Standardized tests and measures addressing body structure, function, activity limitation and / or participation in recreation  ;Presentation LOW Complexity : Stable, uncomplicated  ;Clinical Decision Making MEDIUM Complexity : FOTO score of 26-74  Overall Complexity Rating: MEDIUM  Problem List: pain affecting function, decrease ROM, decrease strength, impaired gait/ balance, decrease ADL/ functional abilitiies, decrease activity tolerance, decrease flexibility/ joint mobility and decrease transfer abilities   Treatment Plan may include any combination of the following: Therapeutic exercise, Therapeutic activities, Neuromuscular re-education, Physical agent/modality, Manual therapy, Patient education, Self Care training, Functional mobility training and Home safety training  Patient / Family readiness to learn indicated by: asking questions, trying to perform skills and interest  Persons(s) to be included in education: patient (P) and family support person (FSP);list pt's spouse  Barriers to Learning/Limitations: None  Patient Goal (s): reduce pain, possibly strength neck and core if necessary  Patient Self Reported Health Status: good  Rehabilitation Potential: good    Short Term Goals: To be accomplished in 1 weeks:  1. Pt will report compliance and independence to [facility] to help the pt manage their pain and symptoms. Long Term Goals: To be accomplished in 4 weeks:  1. Pt will report minimal to no pain in the low back upon standing from a chair to improve ease of transfers. 2. Pt will increase AROM lumbar EXT to % of WNL, right SB and rotation to WNL with minimal to no pain to improve ability to tolerate functional tasks.   3. Pt will report being able to walk for 10 mins with minimal to no increase in low back pain to improve walking ability in the community. Frequency / Duration: Patient to be seen 2 times per week for 4 weeks. Patient/ Caregiver education and instruction: Diagnosis, prognosis, self care, activity modification and exercises   [x]  Plan of care has been reviewed with ERIN Noel, PT 3/13/2017 3:55 PM  _____________________________________________________________________  I certify that the above Therapy Services are being furnished while the patient is under my care. I agree with the treatment plan and certify that this therapy is necessary.     Physician's Signature:____________________  Date:__________Time:______    Please sign and return to [facility] at [address] [address] [address]  Phone: [phone]      Fax:  [phone]

## 2017-03-14 ENCOUNTER — APPOINTMENT (OUTPATIENT)
Dept: PHYSICAL THERAPY | Age: 57
End: 2017-03-14
Payer: COMMERCIAL

## 2017-03-16 ENCOUNTER — HOSPITAL ENCOUNTER (OUTPATIENT)
Dept: PHYSICAL THERAPY | Age: 57
Discharge: HOME | End: 2017-03-16
Payer: COMMERCIAL

## 2017-03-16 PROCEDURE — 97140 MANUAL THERAPY 1/> REGIONS: CPT

## 2017-03-16 PROCEDURE — 97112 NEUROMUSCULAR REEDUCATION: CPT

## 2017-03-16 PROCEDURE — 97110 THERAPEUTIC EXERCISES: CPT

## 2017-03-16 NOTE — PROGRESS NOTES
PT DAILY TREATMENT NOTE     Patient Name: Bella Blackwellning  Date:3/16/2017  : 1960  [x]  Patient  Verified  Payor: BLUE CROSS / Plan:  [facility] / Product Type: PPO /    In time:4:15  Out time:4:45  Total Treatment Time (min): 30  Visit #: 2 of 8    Treatment Area: Dorsalgia, unspecified [M54.9]    SUBJECTIVE  Pain Level (0-10 scale): 2/10  Any medication changes, allergies to medications, adverse drug reactions, diagnosis change, or new procedure performed?: [x] No    [] Yes (see summary sheet for update)  Subjective functional status/changes:   [] No changes reported  \"It actually feels better. \"    OBJECTIVE    15 min Therapeutic Exercise:  [] See flow sheet :   Rationale: increase ROM and increase strength to improve the patients ability to perform ADls without difficulty. 15 min Neuromuscular Re-education:  []  See flow sheet :   Rationale: increase strength  to improve the patients ability to perform ADls without difficulty. With   [] TE   [] TA   [] neuro   [] other: Patient Education: [x] Review HEP    [] Progressed/Changed HEP based on:   [] positioning   [] body mechanics   [] transfers   [] heat/ice application    [] other:      Other Objective/Functional Measures:      Pain Level (0-10 scale) post treatment: 2/10    ASSESSMENT/Changes in Function: Initiated ex. Per flow sheet. Pt reported feeling better during and after therapy. No increase in p! Was reported during ex. Patient will continue to benefit from skilled PT services to address functional mobility deficits, address ROM deficits and address strength deficits to attain remaining goals. []  See Plan of Care  []  See progress note/recertification  []  See Discharge Summary         Progress towards goals / Updated goals:  Short Term Goals: To be accomplished in 1 weeks:  1. Pt will report compliance and independence to HEP to help the pt manage their pain and symptoms. Eval: Established   Current: MET. Pt reports compliance. Long Term Goals: To be accomplished in 4 weeks:  1. Pt will report minimal to no pain in the low back upon standing from a chair to improve ease of transfers. Eval: reports increased LBP upon standing   2. Pt will increase AROM lumbar EXT to % of WNL, right SB and rotation to WNL with minimal to no pain to improve ability to tolerate functional tasks. Eval: 50-75% of WNL with increased pressure/LBP  3. Pt will report being able to walk for 10 mins with minimal to no increase in low back pain to improve walking ability in the community.   Eval: reports LBP immediately with walking    PLAN  [x]  Upgrade activities as tolerated     [x]  Continue plan of care  []  Update interventions per flow sheet       []  Discharge due to:_  []  Other:_      George Morrison PTA 3/16/2017  4:52 PM    Future Appointments  Date Time Provider Neli Ragsdale   3/21/2017 3:30 PM George Morrison PTA [facility]   3/23/2017 3:30 PM George Morrison, PTA [facility]   3/28/2017 3:30 PM George Douglasa, PTA [facility]   3/30/2017 3:30 PM George Morrison, PTA [facility]   4/4/2017 3:30 PM Relxin Douglasa, PTA [facility]   4/6/2017 3:30 PM Relxin Douglasa, PTA [facility]   4/11/2017 3:30 PM George Morrison, PTA [facility]   4/13/2017 3:30 PM George Morrison, PTA [facility]   4/18/2017 3:30 PM George Morrison PTA [facility]   4/20/2017 3:30 PM George Morrison PTA [facility]   4/25/2017 3:30 PM George Morrison PTA [facility]   4/27/2017 3:30 PM George Morrison PTA [facility]

## 2017-03-16 NOTE — PROGRESS NOTES
PT DAILY TREATMENT NOTE     Patient Name: Bella Binning  Date:3/16/2017  : 1960  [x]  Patient  Verified  Payor: BLUE CROSS / Plan: [address] [place] [address] [place] / Product Type: PPO /    In time:3:30  Out time:4:15  Total Treatment Time (min): 45  Visit #: 8 of 10    Treatment Area: Pain in left shoulder [M25.512]    SUBJECTIVE  Pain Level (0-10 scale): 0/10  Any medication changes, allergies to medications, adverse drug reactions, diagnosis change, or new procedure performed?: [x] No    [] Yes (see summary sheet for update)  Subjective functional status/changes:   [] No changes reported      OBJECTIVE    Modality rationale: decrease inflammation and decrease pain to improve the patients ability to perform ADls without difficulty. Min Type Additional Details    [] Estim:  []Unatt       []IFC  []Premod                        []Other:  []w/ice   []w/heat  Position:  Location:    [] Estim: []Att    []TENS instruct  []NMES                    []Other:  []w/US   []w/ice   []w/heat  Position:  Location:    []  Traction: [] Cervical       []Lumbar                       [] Prone          []Supine                       []Intermittent   []Continuous Lbs:  [] before manual  [] after manual    []  Ultrasound: []Continuous   [] Pulsed                           []1MHz   []3MHz W/cm2:  Location:    []  Iontophoresis with dexamethasone         Location: [] Take home patch   [] In clinic   10 [x]  Ice     []  heat  []  Ice massage  []  Laser   []  Anodyne Position:seated  Location:Left shoulder    []  Laser with stim  []  Other:  Position:  Location:    []  Vasopneumatic Device Pressure:       [] lo [] med [] hi   Temperature: [] lo [] med [] hi   [x] Skin assessment post-treatment:  [x]intact [x]redness- no adverse reaction    []redness  adverse reaction:     20 min Therapeutic Exercise:  [] See flow sheet :   Rationale: increase ROM to improve the patients ability to perform ADls without difficulty.     15 min Manual Therapy:  Left scap mobs, TrP release to left UTs in right S/L,    Rationale: decrease pain, increase ROM and increase tissue extensibility to perform ADLs without difficulty. With   [] TE   [] TA   [] neuro   [] other: Patient Education: [x] Review HEP    [] Progressed/Changed HEP based on:   [] positioning   [] body mechanics   [] transfers   [] heat/ice application    [] other:      Other Objective/Functional Measures: Functional Gains: \"Sleeping, pain\"  Functional Deficits: \"sleeping, pain, moving into different positions\"  % improvement: 20-30%  Pain   Average: 3/10       Best: 0/10     Worst: 6-7/10  Patient Goal: \"To get to the next phase\"     Pain Level (0-10 scale) post treatment: 2/10    ASSESSMENT/Changes in Function: Added neck ex. And stretches per flow sheet. Pt reported no increase in p! During ex. And was able to perform all ex. Per flow sheet. Patient will continue to benefit from skilled PT services to address functional mobility deficits, address ROM deficits and address strength deficits to attain remaining goals. []  See Plan of Care  []  See progress note/recertification  []  See Discharge Summary         Progress towards goals / Updated goals:  Short Term Goals: To be accomplished in 1 weeks:  1. Pt will report compliance and independence to HEP to help the pt manage their pain and symptoms. Eval: established  Current: MET. Pt reports compliance. Long Term Goals: To be accomplished in 5 weeks:  1. Pt will increase FOTO score to 56 points to improve ability to perform ADLs. Eval: 16 points  Current: 37 points (increase in 21 points since evaluation) 2/23/2017  2. Pt will report a decrease in at worst pain to 8/10 and at best pain to 4/10 to improve activity tolerance. Eval: at worst 10/10, at best 6/10  Current: MET. at worst 6-7/10 at night, at best 0/10  3.  Pt will increase PROM left shoulder flex to at least 90 degs, ER (at neutral ABD) to 30 degs, IR (at neutral ABD) to 50 degs to improve ability to progress through protocol with more ease. Eval: PROM flex ~20 degs, ER an IR unable to measure secondary to increased pain during evaluation. Current:MET. AAROM flex 95 degs (visual observation) on pulleys.    [x]  Upgrade activities as tolerated     [x]  Continue plan of care  []  Update interventions per flow sheet       []  Discharge due to:_  []  Other:_      Linda Dunn, PTA 3/16/2017  12:30 PM    Future Appointments  Date Time Provider Neli Ragsdale   3/16/2017 3:30 PM Linda Dunn, PTA [facility]   3/16/2017 4:00 PM Danchelsey Rendonness, PTA [facility]   3/21/2017 3:30 PM Dannis Justiceness, PTA [facility]   3/23/2017 3:30 PM Dannis Justiceness, PTA [facility]   3/28/2017 3:30 PM Dannis Newness, PTA [facility]   3/30/2017 3:30 PM Dannis Newness, PTA [facility]   4/4/2017 3:30 PM Dannis Newness, PTA [facility]   4/6/2017 3:30 PM Dannis Newness, PTA [facility]   4/11/2017 3:30 PM Dannis Newness, PTA [facility]   4/13/2017 3:30 PM Dannis Newness, PTA [facility]   4/18/2017 3:30 PM Linda Dunn, ERIN [facility]   4/20/2017 3:30 PM Linda Dunn PTA [facility]   4/25/2017 3:30 PM Linda Dunn PTA [facility]   4/27/2017 3:30 PM Linda Dunn PTA [facility]

## 2017-03-17 NOTE — PROGRESS NOTES
[facility] at [address] [facility]  [address]  Phone: [phone]      Fax:  [phone]    Progress Note  Patient name: Ananth Jones Start of Care: 2017   Referral source: Lizabeth Gonzalez MD : 1960    Medical Diagnosis: Pain in left shoulder [M25.512] Onset Date:DOS 2017    Treatment Diagnosis: pain in left shoulder s/p left shoulder arthroscope SAD, RCR   Prior Hospitalization: see medical history Provider#: [id number]   Medications: Verified on Patient summary List   Comorbidities: right RCR repair 3/8/2016, left TKR , HTN, arthritis  Prior Level of Function: Independent with ADLs, functional, and work tasks with pain in the left shoulder before the surgery. Visits from Start of Care: 8    Missed Visits: 1    Established Goals:          Excellent Good         Limited           None  [x] Increased ROM   []  [x]  []  []  [] Increased Strength  []  []  []  []  [x] Increased Mobility  []  []  [x]  []   [x] Decreased Pain   []  [x]  []  []  [] Decreased Swelling  []  []  []  []    Key Functional Changes:   Functional Gains: \"Sleeping, pain\"  Functional Deficits: \"sleeping, pain, moving into different positions\"  % improvement: 20-30%  Pain Average: 3/10  Best: 0/10  Worst: 6-7/10  Patient Goal: \"To get to the next phase    Current goals:  Short Term Goals: To be accomplished in 1 weeks:  1. Pt will report compliance and independence to [facility] to help the pt manage their pain and symptoms. Eval: established  Current: MET. Pt reports compliance. Long Term Goals: To be accomplished in 5 weeks:  1. Pt will increase FOTO score to 56 points to improve ability to perform ADLs. Eval: 16 points  Current: 37 points (increase in 21 points since evaluation) 2017  2. Pt will report a decrease in at worst pain to 8/10 and at best pain to 4/10 to improve activity tolerance. Eval: at worst 10/10, at best 6/10  Current: MET.  at worst 6-7/10 at night, at best 0/10  3. Pt will increase PROM left shoulder flex to at least 90 degs, ER (at neutral ABD) to 30 degs, IR (at neutral ABD) to 50 degs to improve ability to progress through protocol with more ease. Eval: PROM flex ~20 degs, ER an IR unable to measure secondary to increased pain during evaluation. Current:MET. AAROM flex 95 degs (visual observation) on pulleys. Updated Goals: to be achieved in 4 weeks:  1. Pt will increase FOTO score to 56 points to improve ability to perform ADLs. PN:  37 points (increase in 21 points since evaluation) 2/23/2017  2. Pt will report a decrease in at worst pain to 5/10 to improve activity tolerance. PN: at worst 6-7/10 at night  3. Pt will increase PROM left shoulder flex to at least 130 degs, ER (at neutral ABD) to 30 degs, IR (at neutral ABD) to 50 degs to improve ability to progress through protocol with more ease. PN: AAROM flex 95 degs (visual observation) on pulleys. ASSESSMENT/RECOMMENDATIONS:  Pt has demonstrated improvements in pain and ROM since start of care. Pt continues to have PROM limitations at this time. We will continue therapy to help improve pt's ROM and mobility to help the pt progress through the protocol with increased ease.     [x]Continue therapy per initial plan/protocol at a frequency of  2 x per week for 4 weeks  []Continue therapy with the following recommended changes:_____________________      _____________________________________________________________________  []Discontinue therapy progressing towards or have reached established goals  []Discontinue therapy due to lack of appreciable progress towards goals  []Discontinue therapy due to lack of attendance or compliance  []Await Physician's recommendations/decisions regarding therapy  []Other:________________________________________________________________    Thank you for this referral.   Kelly Michaud, PT 3/17/2017 12:31 PM  NOTE TO PHYSICIAN:  PLEASE COMPLETE THE ORDERS BELOW AND FAX TO [facility]: [phone]  If you are unable to process this request in 24 hours please contact our office:   [phone]  []  I have read the above report and request that my patient continue as recommended. []  I have read the above report and request that my patient continue therapy with the following changes/special instructions:________________________________________  []I have read the above report and request that my patient be discharged from therapy.     Physicians signature: ______________________________Date: ______Time:______

## 2017-03-21 ENCOUNTER — HOSPITAL ENCOUNTER (OUTPATIENT)
Dept: PHYSICAL THERAPY | Age: 57
Discharge: HOME | End: 2017-03-21
Payer: COMMERCIAL

## 2017-03-21 PROCEDURE — 97140 MANUAL THERAPY 1/> REGIONS: CPT

## 2017-03-21 PROCEDURE — 97035 APP MDLTY 1+ULTRASOUND EA 15: CPT

## 2017-03-21 PROCEDURE — 97110 THERAPEUTIC EXERCISES: CPT

## 2017-03-21 NOTE — PROGRESS NOTES
PT DAILY TREATMENT NOTE     Patient Name: Milton Bradley  Date:3/21/2017  : 1960  [x]  Patient  Verified  Payor: Shilpa Andujar / Plan:  [facility] [place] / Product Type: PPO /    In time:4:10  Out time:4:30  Total Treatment Time (min): 20  Visit #: 3 of 8    Treatment Area: Dorsalgia, unspecified [M54.9]    SUBJECTIVE  Pain Level (0-10 scale): 1-2/10  Any medication changes, allergies to medications, adverse drug reactions, diagnosis change, or new procedure performed?: [x] No    [] Yes (see summary sheet for update)  Subjective functional status/changes:   [] No changes reported  \"It just hurts right in my LB. \"    OBJECTIVE    Modality rationale: decrease inflammation and decrease pain to improve the patients ability to perform ADLs without difficulty. Min Type Additional Details    [] Estim:  []Unatt       []IFC  []Premod                        []Other:  []w/ice   []w/heat  Position:  Location:    [] Estim: []Att    []TENS instruct  []NMES                    []Other:  []w/US   []w/ice   []w/heat  Position:  Location:    []  Traction: [] Cervical       []Lumbar                       [] Prone          []Supine                       []Intermittent   []Continuous Lbs:  [] before manual  [] after manual   8 [x]  Ultrasound: [x]Continuous   [] Pulsed                           [x]1MHz   []3MHz W/cm2:1.1  Location:left LB    []  Iontophoresis with dexamethasone         Location: [] Take home patch   [] In clinic    []  Ice     []  heat  []  Ice massage  []  Laser   []  Anodyne Position:  Location:    []  Laser with stim  []  Other:  Position:  Location:    []  Vasopneumatic Device Pressure:       [] lo [] med [] hi   Temperature: [] lo [] med [] hi   [x] Skin assessment post-treatment:  [x]intact [x]redness- no adverse reaction    []redness  adverse reaction:  12 min Manual Therapy:  MET to correct left post rotated ilium. Rationale: decrease pain to ambulate without difficulty.           With   [] TE   [] TA   [] neuro   [] other: Patient Education: [x] Review HEP    [] Progressed/Changed HEP based on:   [] positioning   [] body mechanics   [] transfers   [] heat/ice application    [] other:      Other Objective/Functional Measures:      Pain Level (0-10 scale) post treatment: 0/10    ASSESSMENT/Changes in Function: Held ex. Today due to focusing on shoulder. Pt reported feeling no pain after US and MET. Patient will continue to benefit from skilled PT services to modify and progress therapeutic interventions, address functional mobility deficits, address ROM deficits and address strength deficits to attain remaining goals. []  See Plan of Care  []  See progress note/recertification  []  See Discharge Summary         Progress towards goals / Updated goals:  Short Term Goals: To be accomplished in 1 weeks:  1. Pt will report compliance and independence to HEP to help the pt manage their pain and symptoms. Eval: Established   Current: MET. Pt reports compliance. Long Term Goals: To be accomplished in 4 weeks:  1. Pt will report minimal to no pain in the low back upon standing from a chair to improve ease of transfers. Eval: reports increased LBP upon standing   2. Pt will increase AROM lumbar EXT to % of WNL, right SB and rotation to WNL with minimal to no pain to improve ability to tolerate functional tasks. Eval: 50-75% of WNL with increased pressure/LBP  3. Pt will report being able to walk for 10 mins with minimal to no increase in low back pain to improve walking ability in the community.   Eval: reports LBP immediately with walking       PLAN  [x]  Upgrade activities as tolerated     [x]  Continue plan of care  []  Update interventions per flow sheet       []  Discharge due to:_  []  Other:_      Hema Schumacher PTA 3/21/2017  4:32 PM    Future Appointments  Date Time Provider Neli Ragsdale   3/23/2017 3:00 PM Hema Schumacher PTA [facility]   3/23/2017 3:30 PM Hema Schumacher PTA [facility]   3/28/2017 3:00 PM Ely Bigness, PTA [facility] AND [facility]   3/28/2017 3:30 PM Ely Bigness, PTA [facility]   3/30/2017 3:00 PM Ely Bigness, PTA [facility]   3/30/2017 3:30 PM Ely Bigness, PTA [facility]   4/4/2017 3:00 PM Ely Bigness, PTA [facility] AND [facility]   4/4/2017 3:30 PM Ely Bigness, PTA [facility] AND [facility]   4/6/2017 3:00 PM Ely Bigness, PTA [facility] AND [facility]   4/6/2017 3:30 PM Ely Bigness, PTA [facility] AND [facility]   4/11/2017 3:00 PM Ely Bigness, PTA [facility] AND [facility]   4/11/2017 3:30 PM Ely Bigness, PTA [facility] AND [facility]   4/13/2017 3:00 PM Ely Bigness, PTA [facility]   4/13/2017 3:30 PM Ely Bigness, PTA [facility] AND [facility]   4/18/2017 3:00 PM Ely Bigness, PTA [facility] AND [facility]   4/18/2017 3:30 PM Ely Bigroger, PTA [facility]   4/20/2017 3:00 PM Ely Bigness, PTA [facility]   4/20/2017 3:30 PM Ely Bigness, PTA [facility]   4/25/2017 3:00 PM Ely Bigness, PTA [facility]   4/25/2017 3:30 PM Ely Bigness, PTA [facility]   4/27/2017 3:00 PM Ely Bigroger, PTA [facility]   4/27/2017 3:30 PM Ely Bigroger, PTA [facility]

## 2017-03-21 NOTE — PROGRESS NOTES
PT DAILY TREATMENT NOTE     Patient Name: Yudith Delcid  Date:3/21/2017  : 1960  [x]  Patient  Verified  Payor: LINDA CHANG / Plan: Isma Zamora / Product Type: PPO /    In time:3:30  Out time:4:10  Total Treatment Time (min): 40  Visit #: 1 of 8    Treatment Area: Pain in left shoulder [M25.512]    SUBJECTIVE  Pain Level (0-10 scale): 2/10  Any medication changes, allergies to medications, adverse drug reactions, diagnosis change, or new procedure performed?: [x] No    [] Yes (see summary sheet for update)  Subjective functional status/changes:   [] No changes reported      OBJECTIVE    Modality rationale: decrease inflammation, decrease pain and increase tissue extensibility to improve the patients ability to perform ADls without difficulty.    Min Type Additional Details    [] Estim:  []Unatt       []IFC  []Premod                        []Other:  []w/ice   []w/heat  Position:  Location:    [] Estim: []Att    []TENS instruct  []NMES                    []Other:  []w/US   []w/ice   []w/heat  Position:  Location:    []  Traction: [] Cervical       []Lumbar                       [] Prone          []Supine                       []Intermittent   []Continuous Lbs:  [] before manual  [] after manual   8 [x]  Ultrasound: [x]Continuous   [] Pulsed                           [x]1MHz   []3MHz W/cm2:1.1  Location:left rhomboid    []  Iontophoresis with dexamethasone         Location: [] Take home patch   [] In clinic    []  Ice     []  heat  []  Ice massage  []  Laser   []  Anodyne Position:  Location:    []  Laser with stim  []  Other:  Position:  Location:    []  Vasopneumatic Device Pressure:       [] lo [] med [] hi   Temperature: [] lo [] med [] hi   [x] Skin assessment post-treatment:  [x]intact [x]redness- no adverse reaction    []redness  adverse reaction:   17 min Therapeutic Exercise:  [] See flow sheet :   Rationale: increase ROM and increase strength to improve the patients ability to perform ADls without difficulty. 15 min Manual Therapy:  Left scap mobs, TrP release to left UTs in right S/L. Rationale: decrease pain, increase ROM and increase tissue extensibility to perform ADLs without difficulty. With   [] TE   [] TA   [] neuro   [] other: Patient Education: [x] Review HEP    [] Progressed/Changed HEP based on:   [] positioning   [] body mechanics   [] transfers   [] heat/ice application    [] other:      Other Objective/Functional Measures:      Pain Level (0-10 scale) post treatment: 1-2/10    ASSESSMENT/Changes in Function: Held some ex. Today due to pt. Wanting to work on back and shoulder in an hour. Initiated US per flow sheet today to help decrease p! Pt reported a slight decrease in p! After therapy today. Patient will continue to benefit from skilled PT services to modify and progress therapeutic interventions, address functional mobility deficits, address ROM deficits and analyze and address soft tissue restrictions to attain remaining goals. []  See Plan of Care  []  See progress note/recertification  []  See Discharge Summary         Progress towards goals / Updated goals:  1. Pt will increase FOTO score to 56 points to improve ability to perform ADLs. PN:  37 points (increase in 21 points since evaluation) 2/23/2017  2. Pt will report a decrease in at worst pain to 5/10 to improve activity tolerance. PN: at worst 6-7/10 at night  3. Pt will increase PROM left shoulder flex to at least 130 degs, ER (at neutral ABD) to 30 degs, IR (at neutral ABD) to 50 degs to improve ability to progress through protocol with more ease. PN: AAROM flex 95 degs (visual observation) on pulleys.      PLAN  [x]  Upgrade activities as tolerated     [x]  Continue plan of care  []  Update interventions per flow sheet       []  Discharge due to:_  []  Other:_      Juhi Mo PTA 3/21/2017  4:37 PM    Future Appointments  Date Time Provider Neli Ragsdale   3/23/2017 3:00 PM Jesica Castillo, [facility]   3/23/2017 3:30 PM Jesica Castillo, PTA [facility]   3/28/2017 3:00 PM Jesica Castlilo, PTA [facility] AND [facility]   3/28/2017 3:30 PM Jesica Castillo, PTA [facility]"" [facility]   3/30/2017 3:00 PM Jesica Castillo, PTA [facility] AND [facility]   3/30/2017 3:30 PM Jesica Castillo, [facility]   4/4/2017 3:00 PM Jesica Castillo, [facility]"" [facility]   4/4/2017 3:30 PM Jesica Castillo, [facility]"" [facility]   4/6/2017 3:00 PM Jesica Castillo, PTA [facility]"" [facility]   4/6/2017 3:30 PM Jesica Castillo, ERIN [facility] AND [facility] [place] [facility]"" [facility]   4/11/2017 3:00 PM Jesica Castillo, [facility]   4/11/2017 3:30 PM Jesica Castillo, PTA [facility]"" [facility]   4/13/2017 3:00 PM Jesica Castillo, [facility]"" [facility]   4/13/2017 3:30 PM Jesica Castillo, [facility]"" [facility]   4/18/2017 3:00 PM Jesica Castillo, PTA [facility]"" [facility]   4/18/2017 3:30 PM Jesiac Castillo, [facility] AND [facility]   4/20/2017 3:00 PM Jesica Castillo, [facility] AND [facility]   4/20/2017 3:30 PM Jesica Castillo, [facility] AND [facility]   4/25/2017 3:00 PM Jesica Castillo, [facility] AND [facility]   4/25/2017 3:30 PM Jesica Castillo, [facility]"" [facility]   4/27/2017 3:00 PM Jesica Castillo, [facility]"" [facility]   4/27/2017 3:30 PM Jesica Castillo, [facility] [place] [facility]"" [facility]

## 2017-03-23 ENCOUNTER — HOSPITAL ENCOUNTER (OUTPATIENT)
Dept: PHYSICAL THERAPY | Age: 57
Discharge: HOME | End: 2017-03-23
Payer: COMMERCIAL

## 2017-03-23 PROCEDURE — 97112 NEUROMUSCULAR REEDUCATION: CPT

## 2017-03-23 PROCEDURE — 97110 THERAPEUTIC EXERCISES: CPT

## 2017-03-23 PROCEDURE — 97035 APP MDLTY 1+ULTRASOUND EA 15: CPT

## 2017-03-23 PROCEDURE — 97140 MANUAL THERAPY 1/> REGIONS: CPT

## 2017-03-23 NOTE — PROGRESS NOTES
PT DAILY TREATMENT NOTE     Patient Name: Ananth Jones  Date:3/23/2017  : 1960  [x]  Patient  Verified  Payor: BLUE CROSS / Plan: [facility] [place] / Product Type: PPO /    In time:4:00  Out time:4:35  Total Treatment Time (min): 35  Visit #: 2 of 8    Treatment Area: Pain in left shoulder [M25.512]    SUBJECTIVE  Pain Level (0-10 scale): 1-2/10  Any medication changes, allergies to medications, adverse drug reactions, diagnosis change, or new procedure performed?: [x] No    [] Yes (see summary sheet for update)  Subjective functional status/changes:   [x] No changes reported      OBJECTIVE    Modality rationale: decrease inflammation and decrease pain to improve the patients ability to perform ADLs without difficulty. Min Type Additional Details    [] Estim:  []Unatt       []IFC  []Premod                        []Other:  []w/ice   []w/heat  Position:  Location:    [] Estim: []Att    []TENS instruct  []NMES                    []Other:  []w/US   []w/ice   []w/heat  Position:  Location:    []  Traction: [] Cervical       []Lumbar                       [] Prone          []Supine                       []Intermittent   []Continuous Lbs:  [] before manual  [] after manual    []  Ultrasound: []Continuous   [] Pulsed                           []1MHz   []3MHz W/cm2:  Location:    []  Iontophoresis with dexamethasone         Location: [] Take home patch   [] In clinic   10 [x]  Ice     []  heat  []  Ice massage  []  Laser   []  Anodyne Position: seated  Location:left sh.    []  Laser with stim  []  Other:  Position:  Location:    []  Vasopneumatic Device Pressure:       [] lo [] med [] hi   Temperature: [] lo [] med [] hi   [x] Skin assessment post-treatment:  [x]intact [x]redness- no adverse reaction    []redness  adverse reaction:    15 min Therapeutic Exercise:  [] See flow sheet :   Rationale: increase ROM to improve the patients ability to perform ADLs without difficulty.     10 min Manual Therapy:  Left scap mobs, TrP release to left UTs in right S/L. Rationale: decrease pain, increase ROM and increase tissue extensibility to perform ADLs without difficulty. With   [] TE   [] TA   [] neuro   [] other: Patient Education: [x] Review HEP    [] Progressed/Changed HEP based on:   [] positioning   [] body mechanics   [] transfers   [] heat/ice application    [] other:      Other Objective/Functional Measures:      Pain Level (0-10 scale) post treatment: 2-3/10    ASSESSMENT/Changes in Function: Continued with current ex. Per flow sheet. Pt reported a slight increase in p! After therapy today. Patient will continue to benefit from skilled PT services to modify and progress therapeutic interventions, address functional mobility deficits, address ROM deficits and address strength deficits to attain remaining goals. []  See Plan of Care  []  See progress note/recertification  []  See Discharge Summary         Progress towards goals / Updated goals:  1. Pt will increase FOTO score to 56 points to improve ability to perform ADLs. PN: 37 points (increase in 21 points since evaluation) 2/23/2017  2. Pt will report a decrease in at worst pain to 5/10 to improve activity tolerance. PN: at worst 6-7/10 at night  3. Pt will increase PROM left shoulder flex to at least 130 degs, ER (at neutral ABD) to 30 degs, IR (at neutral ABD) to 50 degs to improve ability to progress through protocol with more ease.   PN: AAROM flex 95 degs (visual observation) on pulleys.        PLAN  [x]  Upgrade activities as tolerated     [x]  Continue plan of care  []  Update interventions per flow sheet       []  Discharge due to:_  []  Other:_      Ely Horta PTA 3/23/2017  2:57 PM    Future Appointments  Date Time Provider Neli Ragsdale   3/23/2017 3:00 PM Ely Horta PTA [facility]   3/23/2017 3:30 PM Ely Horta PTA [facility]   3/28/2017 3:00 PM Ely Horta PTA [facility]   3/28/2017 3:30 PM Shirlie Hernández, PTA [facility]   3/30/2017 3:00 PM Shirlie Hernández, [facility]   3/30/2017 3:30 PM Shirlie Hernández, [facility]   4/4/2017 3:00 PM Shirlie Hernández, [facility]   4/4/2017 3:30 PM Shirlie Hernández, [facility]   4/6/2017 3:00 PM Shirlie Hernández, [facility]   4/6/2017 3:30 PM Shirdelfinae Hernández, [facility]   4/11/2017 3:00 PM Shirlie Hernández, [facility]   4/11/2017 3:30 PM Shirlie Hernández, [facility]   4/13/2017 3:00 PM Shirlie Hernández, [facility]   4/13/2017 3:30 PM Shirlie Hernández, [facility]   4/18/2017 3:00 PM Shirlie Hernández, [facility]   4/18/2017 3:30 PM Shirlie Hernández, [facility]   4/20/2017 3:00 PM Thomas Hernández, [facility]   4/20/2017 3:30 PM Thomas Hernández, [facility]   4/25/2017 3:00 PM Thomas Hernández, [facility]   4/25/2017 3:30 PM Thomas Hernández, [facility]   4/27/2017 3:00 PM Thomas Hernández, [facility]   4/27/2017 3:30 PM Thomas Hernández, [facility]

## 2017-03-23 NOTE — PROGRESS NOTES
PT DAILY TREATMENT NOTE     Patient Name: Karina Mclaughlin  Date:3/23/2017  : 1960  [x]  Patient  Verified  Payor: BLUE CROSS / Plan:  [facility] / Product Type: PPO /    In time:3:00  Out time:4:00  Total Treatment Time (min): 60  Visit #: 4 of 8    Treatment Area: Dorsalgia, unspecified [M54.9]    SUBJECTIVE  Pain Level (0-10 scale): 1/10  Any medication changes, allergies to medications, adverse drug reactions, diagnosis change, or new procedure performed?: [x] No    [] Yes (see summary sheet for update)  Subjective functional status/changes:   [] No changes reported  \"It felt better after I left until I got up the next morning. \"    OBJECTIVE    Modality rationale: decrease inflammation and decrease pain to improve the patients ability to perform ADLs without difficulty.    Min Type Additional Details    [] Estim:  []Unatt       []IFC  []Premod                        []Other:  []w/ice   []w/heat  Position:  Location:    [] Estim: []Att    []TENS instruct  []NMES                    []Other:  []w/US   []w/ice   []w/heat  Position:  Location:    []  Traction: [] Cervical       []Lumbar                       [] Prone          []Supine                       []Intermittent   []Continuous Lbs:  [] before manual  [] after manual   8 [x]  Ultrasound: [x]Continuous   [] Pulsed                           [x]1MHz   []3MHz W/cm2:1.2  Location:left LB    []  Iontophoresis with dexamethasone         Location: [] Take home patch   [] In clinic    []  Ice     []  heat  []  Ice massage  []  Laser   []  Anodyne Position:  Location:    []  Laser with stim  []  Other:  Position:  Location:    []  Vasopneumatic Device Pressure:       [] lo [] med [] hi   Temperature: [] lo [] med [] hi   [x] Skin assessment post-treatment:  [x]intact [x]redness- no adverse reaction    []redness  adverse reaction:     42 min Therapeutic Exercise:  [] See flow sheet :   Rationale: increase ROM and increase strength to improve the patients ability to perform ADLs without difficulty. 10 min Neuromuscular Re-education:  []  See flow sheet :core stab. Ex. Rationale: increase strength  to improve the patients ability to perform ADLs without difficulty. With   [] TE   [] TA   [] neuro   [] other: Patient Education: [x] Review HEP    [] Progressed/Changed HEP based on:   [] positioning   [] body mechanics   [] transfers   [] heat/ice application    [] other:      Other Objective/Functional Measures:      Pain Level (0-10 scale) post treatment: 1-2/10    ASSESSMENT/Changes in Function: Performed ex. Per flow sheet. Pt reported some discomfort but was able to perform. Patient will continue to benefit from skilled PT services to modify and progress therapeutic interventions, address functional mobility deficits, address ROM deficits, address strength deficits and analyze and address soft tissue restrictions to attain remaining goals. []  See Plan of Care  []  See progress note/recertification  []  See Discharge Summary         Progress towards goals / Updated goals:  Short Term Goals: To be accomplished in 1 weeks:  1. Pt will report compliance and independence to HEP to help the pt manage their pain and symptoms. Eval: Established   Current: MET. Pt reports compliance. Long Term Goals: To be accomplished in 4 weeks:  1. Pt will report minimal to no pain in the low back upon standing from a chair to improve ease of transfers. Eval: reports increased LBP upon standing   2. Pt will increase AROM lumbar EXT to % of WNL, right SB and rotation to WNL with minimal to no pain to improve ability to tolerate functional tasks. Eval: 50-75% of WNL with increased pressure/LBP  3. Pt will report being able to walk for 10 mins with minimal to no increase in low back pain to improve walking ability in the community.   Eval: reports LBP immediately with walking       PLAN  [x]  Upgrade activities as tolerated     [x]  Continue plan of care  []  Update interventions per flow sheet       []  Discharge due to:_  []  Other:_      Ban Tomlinsony, PTA 3/23/2017  2:54 PM    Future Appointments  Date Time Provider Neli Ragsdale   3/23/2017 3:00 PM Ban Sly, PTA [facility]   3/23/2017 3:30 PM Ban Sly, PTA [facility]   3/28/2017 3:00 PM Ban Sly, PTA [facility]   3/28/2017 3:30 PM Ban Sly, PTA [facility]   3/30/2017 3:00 PM Ban Sly, PTA [facility]   3/30/2017 3:30 PM Ban Sly, PTA [facility]   4/4/2017 3:00 PM Ban Sly, PTA [facility]   4/4/2017 3:30 PM Ban Sly, PTA [facility]   4/6/2017 3:00 PM Ban Sly, PTA [facility]   4/6/2017 3:30 PM Ban Sly, PTA [facility]   4/11/2017 3:00 PM Ban Sly, PTA [facility]   4/11/2017 3:30 PM Ban Sly, PTA [facility]   4/13/2017 3:00 PM Ban Sly, PTA [facility]   4/13/2017 3:30 PM Ban Sly, PTA [facility]   4/18/2017 3:00 PM Ban Sly, PTA [facility]   4/18/2017 3:30 PM Ban Sly, PTA [facility]   4/20/2017 3:00 PM Ban Sly, PTA [facility]   4/20/2017 3:30 PM Ban Sly, PTA [facility]   4/25/2017 3:00 PM Ban Sly, PTA [facility]   4/25/2017 3:30 PM Ban Sly, PTA [facility]   4/27/2017 3:00 PM Ban Sly, PTA [facility]   4/27/2017 3:30 PM Ban Sly, PTA [facility]

## 2017-03-28 ENCOUNTER — HOSPITAL ENCOUNTER (OUTPATIENT)
Dept: PHYSICAL THERAPY | Age: 57
Discharge: HOME | End: 2017-03-28
Payer: COMMERCIAL

## 2017-03-28 PROCEDURE — 97110 THERAPEUTIC EXERCISES: CPT

## 2017-03-28 PROCEDURE — 97035 APP MDLTY 1+ULTRASOUND EA 15: CPT

## 2017-03-28 PROCEDURE — 97140 MANUAL THERAPY 1/> REGIONS: CPT

## 2017-03-28 NOTE — PROGRESS NOTES
PT DAILY TREATMENT NOTE     Patient Name: Jacky Oliveira  Date:3/28/2017  : 1960  [x]  Patient  Verified  Payor: BLUE CROSS / Plan:  [facility] / Product Type: PPO /    In time:3:40  Out time:4:40  Total Treatment Time (min): 60  Visit #: 3 of 8    Treatment Area: Pain in left shoulder [M25.512]    SUBJECTIVE  Pain Level (0-10 scale): 0/10  Any medication changes, allergies to medications, adverse drug reactions, diagnosis change, or new procedure performed?: [x] No    [] Yes (see summary sheet for update)  Subjective functional status/changes:   [] No changes reported  \"I don't feel it right now. \"    OBJECTIVE    Modality rationale: decrease inflammation and decrease pain to improve the patients ability to perform ADLs without difficulty.    Min Type Additional Details    [] Estim:  []Unatt       []IFC  []Premod                        []Other:  []w/ice   []w/heat  Position:  Location:    [] Estim: []Att    []TENS instruct  []NMES                    []Other:  []w/US   []w/ice   []w/heat  Position:  Location:    []  Traction: [] Cervical       []Lumbar                       [] Prone          []Supine                       []Intermittent   []Continuous Lbs:  [] before manual  [] after manual    []  Ultrasound: []Continuous   [] Pulsed                           []1MHz   []3MHz W/cm2:  Location:    []  Iontophoresis with dexamethasone         Location: [] Take home patch   [] In clinic   10 [x]  Ice     []  heat  []  Ice massage  []  Laser   []  Anodyne Position:seated  Location:left sh.    []  Laser with stim  []  Other:  Position:  Location:    []  Vasopneumatic Device Pressure:       [] lo [] med [] hi   Temperature: [] lo [] med [] hi   [x] Skin assessment post-treatment:  [x]intact [x]redness- no adverse reaction    []redness  adverse reaction:   40 min Therapeutic Exercise:  [] See flow sheet :   Rationale: increase ROM to improve the patients ability to perform ADls without difficulty. 10 min Manual Therapy:  Left scap mobs, TrP release to left UTs in right S/L. Rationale: decrease pain, increase ROM and increase tissue extensibility to perform ADLs without difficulty. With   [] TE   [] TA   [] neuro   [] other: Patient Education: [x] Review HEP    [] Progressed/Changed HEP based on:   [] positioning   [] body mechanics   [] transfers   [] heat/ice application    [] other:      Other Objective/Functional Measures:      Pain Level (0-10 scale) post treatment: 3/10    ASSESSMENT/Changes in Function: Continued with current ex. Per flow sheet. Pt reported a slight increase in p! After therapy. Patient will continue to benefit from skilled PT services to address functional mobility deficits, address ROM deficits and address strength deficits to attain remaining goals. []  See Plan of Care  []  See progress note/recertification  []  See Discharge Summary         Progress towards goals / Updated goals:  1. Pt will increase FOTO score to 56 points to improve ability to perform ADLs. PN: 37 points (increase in 21 points since evaluation) 2/23/2017  2. Pt will report a decrease in at worst pain to 5/10 to improve activity tolerance. PN: at worst 6-7/10 at night  3. Pt will increase PROM left shoulder flex to at least 130 degs, ER (at neutral ABD) to 30 degs, IR (at neutral ABD) to 50 degs to improve ability to progress through protocol with more ease.   PN: AAROM flex 95 degs (visual observation) on pulleys.        PLAN  [x]  Upgrade activities as tolerated     [x]  Continue plan of care  []  Update interventions per flow sheet       []  Discharge due to:_  []  Other:_      Rubia Rashid PTA 3/28/2017  4:28 PM    Future Appointments  Date Time Provider Neli Ragsdale   3/30/2017 3:00 PM Vin Foreman PT [facility]   3/30/2017 3:30 PM Vin Foreman PT [facility]   4/4/2017 3:00 PM Rubia Rashid PTA [facility]   4/4/2017 3:30 PM [id number] Aftab Silence [facility]   4/6/2017 3:00 PM Zada Divers, PTA [facility]   4/6/2017 3:30 PM Zada Divers, PTA [facility]   4/11/2017 3:00 PM Zada Divers, PTA [facility]   4/11/2017 3:30 PM Zada Divers, PTA [facility]   4/13/2017 3:00 PM Zada Divers, PTA [facility]   4/13/2017 3:30 PM Zada Divers, PTA [facility]   4/18/2017 3:00 PM Zada Divers, PTA [facility]   4/18/2017 3:30 PM Zada Divers, PTA [facility]   4/20/2017 3:00 PM Zada Divers, PTA [facility]   4/20/2017 3:30 PM Zada Divers, PTA [facility]   4/25/2017 3:00 PM Zada Divers, PTA [facility]   4/25/2017 3:30 PM Zada Divers, PTA [facility]   4/27/2017 3:00 PM Zada Divers, PTA [facility]   4/27/2017 3:30 PM Mamadou Ureña, PTA [facility]

## 2017-03-28 NOTE — PROGRESS NOTES
PT DAILY TREATMENT NOTE     Patient Name: Stacy Stapleton  Date:3/28/2017  : 1960  [x]  Patient  Verified  Payor: BLUE CROSS / Plan: [employer] [facility] / Product Type: PPO /    In time:3:00  Out time:3:40  Total Treatment Time (min): 40  Visit #: 5 of 8    Treatment Area: Dorsalgia, unspecified [M54.9]    SUBJECTIVE  Pain Level (0-10 scale): 3/10  Any medication changes, allergies to medications, adverse drug reactions, diagnosis change, or new procedure performed?: [x] No    [] Yes (see summary sheet for update)  Subjective functional status/changes:   [] No changes reported  \"It's been hurting on the right. \"     OBJECTIVE    Modality rationale: decrease inflammation and decrease pain to improve the patients ability to perform ADls without difficulty.    Min Type Additional Details    [] Estim:  []Unatt       []IFC  []Premod                        []Other:  []w/ice   []w/heat  Position:  Location:    [] Estim: []Att    []TENS instruct  []NMES                    []Other:  []w/US   []w/ice   []w/heat  Position:  Location:    []  Traction: [] Cervical       []Lumbar                       [] Prone          []Supine                       []Intermittent   []Continuous Lbs:  [] before manual  [] after manual   8 [x]  Ultrasound: [x]Continuous   [] Pulsed                           [x]1MHz   []3MHz W/cm2:1.2  Location:right LB    []  Iontophoresis with dexamethasone         Location: [] Take home patch   [] In clinic    []  Ice     []  heat  []  Ice massage  []  Laser   []  Anodyne Position:  Location:    []  Laser with stim  []  Other:  Position:  Location:    []  Vasopneumatic Device Pressure:       [] lo [] med [] hi   Temperature: [] lo [] med [] hi   [x] Skin assessment post-treatment:  [x]intact [x]redness- no adverse reaction    []redness  adverse reaction:     22 min Therapeutic Exercise:  [] See flow sheet :   Rationale: increase ROM and increase strength to improve the patients ability to perform ADLs without difficulty. 10 min Manual Therapy:  MET to correct left post rotated ilium. Rationale: decrease pain and increase ROM to perform ADls without difficulty. With   [] TE   [] TA   [] neuro   [] other: Patient Education: [x] Review HEP    [] Progressed/Changed HEP based on:   [] positioning   [] body mechanics   [] transfers   [] heat/ice application    [] other:      Other Objective/Functional Measures:      Pain Level (0-10 scale) post treatment: 1/10    ASSESSMENT/Changes in Function: Continued with current ex. Per flow sheet. Pt reported immediatly feeling better after alignment correction. Patient will continue to benefit from skilled PT services to modify and progress therapeutic interventions, address functional mobility deficits, address ROM deficits and address strength deficits to attain remaining goals. []  See Plan of Care  []  See progress note/recertification  []  See Discharge Summary         Progress towards goals / Updated goals:    Short Term Goals: To be accomplished in 1 weeks:  1. Pt will report compliance and independence to HEP to help the pt manage their pain and symptoms. Eval: Established   Current: MET. Pt reports compliance. Long Term Goals: To be accomplished in 4 weeks:  1. Pt will report minimal to no pain in the low back upon standing from a chair to improve ease of transfers. Eval: reports increased LBP upon standing   2. Pt will increase AROM lumbar EXT to % of WNL, right SB and rotation to WNL with minimal to no pain to improve ability to tolerate functional tasks. Eval: 50-75% of WNL with increased pressure/LBP  3. Pt will report being able to walk for 10 mins with minimal to no increase in low back pain to improve walking ability in the community.   Eval: reports LBP immediately with walking     PLAN  [x]  Upgrade activities as tolerated     [x]  Continue plan of care  []  Update interventions per flow sheet       []  Discharge due to:_  []  Other:_      Skyler Shah PTA 3/28/2017  4:13 PM    Future Appointments  Date Time Provider Neli Melyssa   3/30/2017 3:00 PM Alvaro Yoakum, PT [facility]   3/30/2017 3:30 PM Alvaro Yoakum, PT [facility]   2017 3:00 PM Skyler Shah, PTA [facility]   2017 3:30 PM Skyler Shah, PTA [facility]   2017 3:00 PM Skyler Shah, PTA [facility]   2017 3:30 PM Skyler Shah, ERIN [facility]   2017 3:00 PM Skyler Shah, PTA [facility]   2017 3:30 PM Skyler Shah, PTA [facility]   2017 3:00 PM Skyler Shah, ERIN [facility]   2017 3[facility]30 PM Skyler Shah, PTA [facility]   2017 3[facility]00 PM Skyler Shah, PTA [facility]   2017 3:30 PM Skyler Shah, ERIN [facility]   2017 3[facility]00 PM Skyler Shah PTA [facility]   2017 3:30 PM Skyler Shah PTA [facility]   2017 3:00 PM Skyler Shah PTA [facility]   2017 3:30 PM Skyler Shah PTA [facility]   2017 3:00 PM Skyler Shah PTA [facility]   2017 3:30 PM Skyler Shah PTA [facility]

## 2017-03-30 ENCOUNTER — HOSPITAL ENCOUNTER (OUTPATIENT)
Dept: PHYSICAL THERAPY | Age: 57
End: 2017-03-30
Payer: COMMERCIAL

## 2017-04-04 ENCOUNTER — APPOINTMENT (OUTPATIENT)
Dept: PHYSICAL THERAPY | Age: 57
End: 2017-04-04
Payer: COMMERCIAL

## 2017-04-05 ENCOUNTER — HOSPITAL ENCOUNTER (OUTPATIENT)
Dept: PHYSICAL THERAPY | Age: 57
Discharge: HOME | End: 2017-04-05
Payer: COMMERCIAL

## 2017-04-05 ENCOUNTER — APPOINTMENT (OUTPATIENT)
Dept: PHYSICAL THERAPY | Age: 57
End: 2017-04-05
Payer: COMMERCIAL

## 2017-04-05 PROCEDURE — 97140 MANUAL THERAPY 1/> REGIONS: CPT

## 2017-04-05 PROCEDURE — 97110 THERAPEUTIC EXERCISES: CPT

## 2017-04-05 NOTE — PROGRESS NOTES
PT DAILY TREATMENT NOTE     Patient Name: Kita Tijerina  Date:2017  : 1960  [x]  Patient  Verified  Payor: BLUE CROSS / Plan: [employer] [facility] [place] / Product Type: PPO /    In time:2:45  Out time:3:15  Total Treatment Time (min): 30  Visit #: 6 of 8    Treatment Area: Dorsalgia, unspecified [M54.9]    SUBJECTIVE  Pain Level (0-10 scale): 3  Any medication changes, allergies to medications, adverse drug reactions, diagnosis change, or new procedure performed?: [x] No    [] Yes (see summary sheet for update)  Subjective functional status/changes:   [] No changes reported  \"I can feel my back go out of place\"    OBJECTIVE    5 min Therapeutic Exercise:  [x] See flow sheet : exercises and pt education   Rationale: increase ROM and increase strength to improve the patients ability to tolerate ADLs    25 min Manual Therapy:  MET to correct left post rotated innominate, MET to correct right/left BST, pelvic alignment/SIJ alignment, leg length assessment   Rationale: decrease pain and increase ROM to perform ADls    With   [] TE   [] TA   [] neuro   [] other: Patient Education: [x] Review HEP    [] Progressed/Changed HEP based on:   [] positioning   [] body mechanics   [] transfers   [] heat/ice application    [] other:      Other Objective/Functional Measures: Pt reported improved pain in the low back after MET for left posterior rotated innominate but continued to report pain. Pt had increased pain to the right SIJ with palpation and corrected right/left BST with MET. Pain Level (0-10 scale) post treatment: 0    ASSESSMENT/Changes in Function: Improvement in pain reported after pelvic alignment correction stated above. Held some exercises today secondary to pt needing to leave therapy early today. Continue POC as tolerated.      Patient will continue to benefit from skilled PT services to modify and progress therapeutic interventions, address functional mobility deficits, address ROM deficits and address strength deficits to attain remaining goals. []  See Plan of Care  []  See progress note/recertification  []  See Discharge Summary         Progress towards goals / Updated goals:    Short Term Goals: To be accomplished in 1 weeks:  1. Pt will report compliance and independence to HEP to help the pt manage their pain and symptoms. Eval: Established   Current: MET. Pt reports compliance. Long Term Goals: To be accomplished in 4 weeks:  1. Pt will report minimal to no pain in the low back upon standing from a chair to improve ease of transfers. Eval: reports increased LBP upon standing   2. Pt will increase AROM lumbar EXT to % of WNL, right SB and rotation to WNL with minimal to no pain to improve ability to tolerate functional tasks. Eval: 50-75% of WNL with increased pressure/LBP  3. Pt will report being able to walk for 10 mins with minimal to no increase in low back pain to improve walking ability in the community.   Eval: reports LBP immediately with walking     PLAN  [x]  Upgrade activities as tolerated     [x]  Continue plan of care  [x]  Update interventions per flow sheet       []  Discharge due to:_  []  Other:_      Ivy Mclain, PT 4/5/2017  2:46 PM    Future Appointments  Date Time Provider Neli Ragsdale   4/5/2017 2:00 PM Ivy Mclain PT [facility]   4/5/2017 2:30 PM Ivy Mclain PT [facility]   4/7/2017 1:00 PM Rose Mary Rodarte PTA [facility]   4/7/2017 1:30 PM Rose Mary Rodarte PTA [facility]   4/11/2017 3:00 PM Rose Mary Rodarte PTA [facility]   4/11/2017 3:30 PM Rose Mary Rodarte PTA [facility]   4/13/2017 3:00 PM Rose Mary Rodarte PTA [facility]   4/13/2017 3:30 PM Rose Mary Rodarte PTA [facility]   4/18/2017 3:00 PM Rose Mary Rodarte PTA [facility]   4/18/2017 3:30 PM Rose Mary Rodarte PTA [facility]   4/20/2017 3:00 PM Rose Mary Rodarte PTA [facility]   4/20/2017 3:30 PM Rose Mary Rodarte PTA [facility]   4/25/2017 3:00 PM Rose Mary Rodarte PTA [facility]   4/25/2017 3:30 PM Jackie Christina PTA [facility]   4/27/2017 3:00 PM Jackie Christina PTA [facility]   4/27/2017 3:30 PM Jackie Christina PTA [facility]

## 2017-04-05 NOTE — PROGRESS NOTES
PT DAILY TREATMENT NOTE     Patient Name: Juan Trinidad  Date:2017  : 1960  [x]  Patient  Verified  Payor: BLUE CROSS / Plan: [employer] [facility] / Product Type: PPO /    In time:2:05  Out time:2:45  Total Treatment Time (min): 30 (10 mins pt on the phone)  Visit #: 4 of 8    Treatment Area: Pain in left shoulder [M25.512]    SUBJECTIVE  Pain Level (0-10 scale): 0  Any medication changes, allergies to medications, adverse drug reactions, diagnosis change, or new procedure performed?: [x] No    [] Yes (see summary sheet for update)  Subjective functional status/changes:   [] No changes reported  \"it is OK right now\"    OBJECTIVE    18 min Therapeutic Exercise:  [x] See flow sheet :   Rationale: increase ROM to improve the patients ability to perform ADls    12 min Manual Therapy:  Left STJ mobes, TPR release left UT, left shoulder PROM into flex, gentle long axis distraction left shoulder   Rationale: decrease pain, increase ROM and increase tissue extensibility to improve activity tolerance          With   [] TE   [] TA   [] neuro   [] other: Patient Education: [x] Review HEP    [] Progressed/Changed HEP based on:   [] positioning   [] body mechanics   [] transfers   [] heat/ice application    [] other:      Other Objective/Functional Measures: Held some exercises today secondary to pt being on the phone for 10 mins. PROM left shoulder flex ~90 degs (visual observation during manual). Pain Level (0-10 scale) post treatment: 0    ASSESSMENT/Changes in Function: Pt denied CP today post session. Reports increased pain at end range PROM left shoulder flex during manual interventions. Continue POC as tolerated.      Patient will continue to benefit from skilled PT services to modify and progress therapeutic interventions, address functional mobility deficits, address ROM deficits, address strength deficits, analyze and address soft tissue restrictions, analyze and cue movement patterns, analyze and modify body mechanics/ergonomics, assess and modify postural abnormalities and instruct in home and community integration to attain remaining goals. []  See Plan of Care  []  See progress note/recertification  []  See Discharge Summary         Progress towards goals / Updated goals:  1. Pt will increase FOTO score to 56 points to improve ability to perform ADLs. PN: 37 points (increase in 21 points since evaluation) 2/23/2017  2. Pt will report a decrease in at worst pain to 5/10 to improve activity tolerance. PN: at worst 6-7/10 at night  3. Pt will increase PROM left shoulder flex to at least 130 degs, ER (at neutral ABD) to 30 degs, IR (at neutral ABD) to 50 degs to improve ability to progress through protocol with more ease. PN: AAROM flex 95 degs (visual observation) on pulleys.      PLAN  [x]  Upgrade activities as tolerated     [x]  Continue plan of care  [x]  Update interventions per flow sheet       []  Discharge due to:_  []  Other:_      Vin Foreman, PT 4/5/2017  2:15 PM    Future Appointments  Date Time Provider Neli Ragsdale   4/5/2017 2:00 PM Vin Foreman, PT [facility]   4/5/2017 2:30 PM Vin Foreman, PT [facility]   4/7/2017 1:00 PM Rubia Rashid PTA [facility]   4/7/2017 1:30 PM Rubia Rashid PTA [facility]   4/11/2017 3:00 PM Rubia Rashid PTA [facility]   4/11/2017 3:30 PM Rubia Rashid PTA [facility]   4/13/2017 3:00 PM Rubia Rashid PTA [facility]   4/13/2017 3:30 PM Rubia Rashid, [facility]   4/18/2017 3:00 PM Rubia Rashid, ERIN [facility]   4/18/2017 3:30 PM Rubia Rashid, [facility]   4/20/2017 3:00 PM Rubia Rashid, [facility]   4/20/2017 3:30 PM Rubia Rashid PTA [facility]   4/25/2017 3:00 PM Rubia Rashid PTA [facility]   4/25/2017 3:30 PM Rubia Rashid PTA [facility]   4/27/2017 3:00 PM Rubia Rashid PTA [facility]   4/27/2017 3:30 PM Rubia Rashid PTA [facility]

## 2017-04-06 ENCOUNTER — APPOINTMENT (OUTPATIENT)
Dept: PHYSICAL THERAPY | Age: 57
End: 2017-04-06
Payer: COMMERCIAL

## 2017-04-07 ENCOUNTER — HOSPITAL ENCOUNTER (OUTPATIENT)
Dept: PHYSICAL THERAPY | Age: 57
Discharge: HOME | End: 2017-04-07
Payer: COMMERCIAL

## 2017-04-07 PROCEDURE — 97140 MANUAL THERAPY 1/> REGIONS: CPT

## 2017-04-07 PROCEDURE — 97112 NEUROMUSCULAR REEDUCATION: CPT

## 2017-04-07 PROCEDURE — 97110 THERAPEUTIC EXERCISES: CPT

## 2017-04-07 NOTE — PROGRESS NOTES
PT DAILY TREATMENT NOTE     Patient Name: Grey Monreal  Date:2017  : 1960  [x]  Patient  Verified  Payor: BLUE CROSS / Plan:  [facility] [place] / Product Type: PPO /    In time:1:35  Out time:2:15  Total Treatment Time (min): 40  Visit #: 5 of 8    Treatment Area: Pain in left shoulder [M25.512]    SUBJECTIVE  Pain Level (0-10 scale): 0/10  Any medication changes, allergies to medications, adverse drug reactions, diagnosis change, or new procedure performed?: [x] No    [] Yes (see summary sheet for update)  Subjective functional status/changes:   [] No changes reported  \"It's feeling okay. \"    OBJECTIVE    25 min Therapeutic Exercise:  [] See flow sheet :   Rationale: increase ROM and increase strength to improve the patients ability to perform ADLs without difficulty. 15 min Manual Therapy:  Left STJ mobes, TPR release left UT, left shoulder PROM into flex, gentle long axis distraction left shoulder   Rationale: decrease pain, increase ROM and increase tissue extensibility to perform ADLs without difficulty. With   [] TE   [] TA   [] neuro   [] other: Patient Education: [x] Review HEP    [] Progressed/Changed HEP based on:   [] positioning   [] body mechanics   [] transfers   [] heat/ice application    [] other:      Other Objective/Functional Measures: Functional Gains: \"ROM, pain\"  Functional Deficits: \"ROM\"  % improvement: 20%  Pain   Average: 2/10       Best: 0/10     Worst: 3/10  Patient Goal: \"No pain and be able to get back in the gym. \"   FOTO 35  Pain Level (0-10 scale) post treatment: 0/10    ASSESSMENT/Changes in Function: Continued with current ex. Per protocol. Pt reported some discomfort with ex. But was able to perform. Patient will continue to benefit from skilled PT services to address functional mobility deficits, address ROM deficits and address strength deficits to attain remaining goals.      []  See Plan of Care  []  See progress note/recertification  []  See Discharge Summary         Progress towards goals / Updated goals:  1. Pt will increase FOTO score to 56 points to improve ability to perform ADLs. PN: 37 points   Current: Regression: 35, no reason given. 2. Pt will report a decrease in at worst pain to 5/10 to improve activity tolerance. PN: at worst 6-7/10 at night  Current: MET. Pt reports worst pain level 3/10. 3. Pt will increase PROM left shoulder flex to at least 130 degs, ER (at neutral ABD) to 30 degs, IR (at neutral ABD) to 50 degs to improve ability to progress through protocol with more ease. PN: AAROM flex 95 degs (visual observation) on pulleys. Current Progressing.: AAROM flex 155 degs (visual observation) on pulleys. ER (at neutral ABD) 25 degs, IR (at neutral ABD) 45 degs. PLAN  [x]  Upgrade activities as tolerated     [x]  Continue plan of care  []  Update interventions per flow sheet       []  Discharge due to:_  [x]  Other:_  PN 2xs a week for 4 weeks, receive authorization to progress to AROM.     Mau Avery, PTA 4/7/2017  1:23 PM    Future Appointments  Date Time Provider Neli Ragsdale   4/7/2017 1:30 PM Mau Heading, PTA [facility]   4/11/2017 3:00 PM Mau Heading, PTA [facility]   4/11/2017 3:30 PM Mau Heading, PTA [facility]   4/13/2017 3:00 PM Mau Heading, PTA [facility]   4/13/2017 3:30 PM Mau Heading, PTA [facility]   4/18/2017 3:00 PM Mau Heading, PTA [facility]   4/18/2017 3:30 PM Mau Heading, [facility]   4/20/2017 3:00 PM Mau Heading, [facility]   4/20/2017 3:30 PM Mau Heading, PTA [facility]   4/25/2017 3:00 PM Mau Heading, PTA [facility]   4/25/2017 3:30 PM Mau Heading, [facility]   4/27/2017 3:00 PM Mau Heading, PTA [facility]   4/27/2017 3:30 PM Mau Heading, PTA [facility]

## 2017-04-07 NOTE — PROGRESS NOTES
PT DAILY TREATMENT NOTE     Patient Name: Chan Alonzo  Date:2017  : 1960  [x]  Patient  Verified  Payor: BLUE CROSS / Plan: [facility] / Product Type: PPO /    In time:1:00  Out time:1:35  Total Treatment Time (min): 35  Visit #: 7 of 8    Treatment Area: Dorsalgia, unspecified [M54.9]    SUBJECTIVE  Pain Level (0-10 scale): 1/10 sitting, 10/10 standing  Any medication changes, allergies to medications, adverse drug reactions, diagnosis change, or new procedure performed?: [x] No    [] Yes (see summary sheet for update)  Subjective functional status/changes:   [] No changes reported  \"When it feels good, it's fine. When it hurts it's a 10/10. \"    OBJECTIVE    15 min Therapeutic Exercise:  [] See flow sheet :   Rationale: increase ROM and increase strength to improve the patients ability to perform ADLs without difficulty. 10 min Neuromuscular Re-education:  []  See flow sheet :core stab. Ex. Rationale: increase strength  to improve the patients ability to perform ADLs without difficulty. 10 min Manual Therapy:  MET to correct L post rotated innominate. Rationale: decrease pain to perform ADLs without difficulty. With   [] TE   [] TA   [] neuro   [] other: Patient Education: [x] Review HEP    [] Progressed/Changed HEP based on:   [] positioning   [] body mechanics   [] transfers   [] heat/ice application    [] other:      Other Objective/Functional Measures: Functional Gains: \"Pain decreased in upper back. \"  Functional Deficits: \"Increased pain in LB, difficulty with sleeping. \"  % improvement: 40% in upper back, 0% in LB  Pain   Average: 8/10-LB       Best: 0/10-LB     Worst: 10/10-LB  Patient Goal: \"No pain, find out whats causing pain\"   FOTO 38  Pain Level (0-10 scale) post treatment: 4/10     ASSESSMENT/Changes in Function: Continued with current ex. Per flow sheet. No p! Was reported with ex. But pt. Did report increased discomfort after correction.     Patient will continue to benefit from skilled PT services to address functional mobility deficits, address ROM deficits, address strength deficits and analyze and address soft tissue restrictions to attain remaining goals. []  See Plan of Care  []  See progress note/recertification  []  See Discharge Summary         Progress towards goals / Updated goals:  Short Term Goals: To be accomplished in 1 weeks:  1. Pt will report compliance and independence to [facility] to help the pt manage their pain and symptoms. Eval: Established   Current: MET. Pt reports compliance. Long Term Goals: To be accomplished in 4 weeks:  1. Pt will report minimal to no pain in the low back upon standing from a chair to improve ease of transfers. Eval: reports increased LBP upon standing   Current:No change. Pt continues to report increased pain upon standing. 2. Pt will increase AROM lumbar EXT to % of WNL, right SB and rotation to WNL with minimal to no pain to improve ability to tolerate functional tasks. Eval: 50-75% of WNL with increased pressure/LBP  Current: No change. 50-75% of WNL with increased pressure/LBP  3. Pt will report being able to walk for 10 mins with minimal to no increase in low back pain to improve walking ability in the community. Eval: reports LBP immediately with walking  Current:Not met. Pt reports pain immediatley upon standing. PLAN  [x]  Upgrade activities as tolerated     [x]  Continue plan of care  []  Update interventions per flow sheet       []  Discharge due to:_  [x]  Other:_PN- 2xs a week for 3 weeks. To address alignment issues.       Rose Mary Rodarte PTA 4/7/2017  2:07 PM    Future Appointments  Date Time Provider Neli Ragsdale   4/11/2017 3:00 PM Rose Mary Rodarte PTA [facility]   4/11/2017 3:30 PM Rose Mary Rodarte PTA [facility]   4/13/2017 3:00 PM Rose Mary Rodarte PTA [facility]   4/13/2017 3:30 PM Rose Mary Rodarte PTA [facility]   4/18/2017 3:00 PM [facility] [place] [facility] 4/18/2017 3:30 PM Jose Khanna, PTA [facility]   4/20/2017 3:00 PM Jose Khanna, PTA [facility]   4/20/2017 3:30 PM Jose Khanna, PTA [facility]   4/25/2017 3:00 PM Jose Clemencia, PTA [facility]   4/25/2017 3:30 PM Jose Khanna, PTA [facility]   4/27/2017 3:00 PM Jose Khanna, PTA [facility]   4/27/2017 3:30 PM Jose Khanna, PTA [facility]

## 2017-04-10 NOTE — PROGRESS NOTES
[facility] [address] [facility]  [address]  Phone: [phone]      Fax:  [phone]    Progress Note  Patient name: Abimbola Spears Start of Care: 3/13/2017   Referral source: Samuel Menjivar MD : 1960    Medical Diagnosis: Dorsalgia, unspecified [M54.9] Onset Date: 3 weeks ago    Treatment Diagnosis: Low back pain, neck pain   Prior Hospitalization: see medical history Provider#: [id number]   Medications: Verified on Patient summary List   Comorbidities: right RCR repair 3/8/2016, left TKR , HTN, arthritis, left RTC repair DOS 2017  Prior Level of Function: Independent with ADLs, functional, and work tasks with pain in the left shoulder before the surgery. Visits from Start of Care: 7    Missed Visits: 1    Established Goals:          Excellent Good         Limited           None  [x] Increased ROM   []  []  [x]  []  [] Increased Strength  []  []  []  []  [x] Increased Mobility  []  []  [x]  []   [x] Decreased Pain   []  []  [x]  []  [] Decreased Swelling  []  []  []  []    Key Functional Changes:   Functional Gains: \"Pain decreased in upper back. \"  Functional Deficits: \"Increased pain in LB, difficulty with sleeping. \"  % improvement: 40% in upper back, 0% in LB  Pain Average: 8/10-LB  Best: 0/10-LB  Worst: 10/10-LB  Patient Goal: \"No pain, find out whats causing pain\"     Current goals:  Short Term Goals: To be accomplished in 1 weeks:  1. Pt will report compliance and independence to [facility] to help the pt manage their pain and symptoms. Eval: Established   Current: MET. Pt reports compliance. Long Term Goals: To be accomplished in 4 weeks:  1. Pt will report minimal to no pain in the low back upon standing from a chair to improve ease of transfers. Eval: reports increased LBP upon standing   Current:No change. Pt continues to report increased pain upon standing.   2. Pt will increase AROM lumbar EXT to % of WNL, right SB and rotation to WNL with minimal to no pain to improve ability to tolerate functional tasks. Eval: 50-75% of WNL with increased pressure/LBP  Current: No change. 50-75% of WNL with increased pressure/LBP  3. Pt will report being able to walk for 10 mins with minimal to no increase in low back pain to improve walking ability in the community. Eval: reports LBP immediately with walking  Current:Not met. Pt reports pain immediatley upon standing. Updated Goals: to be achieved in 3 weeks:   Continue with unmet goals above    ASSESSMENT/RECOMMENDATIONS:  Pt reports improvements in upper back pain since starting therapy. Pt continues to report having increased low back pain and difficulty with sleeping because of the LBP. Pt continues to have pelvic alignment instability, which corrects after manual interventions. Pt is also currently being seen for therapy for his left shoulder surgery as well and he continues to be PROM of the left shoulder, which may be affecting his low back symptoms. We will continue therapy at this time to address pt's pelvic alignment instability and core strength, along with pt education on posture and positioning to help with the pain and alignment.   [x]Continue therapy per initial plan/protocol at a frequency of  2 x per week for 3 weeks  []Continue therapy with the following recommended changes:_____________________      _____________________________________________________________________  []Discontinue therapy progressing towards or have reached established goals  []Discontinue therapy due to lack of appreciable progress towards goals  []Discontinue therapy due to lack of attendance or compliance  []Await Physician's recommendations/decisions regarding therapy  []Other:________________________________________________________________    Thank you for this referral.   Monika Shah, PT 4/10/2017 5:20 PM  NOTE TO PHYSICIAN:  PLEASE COMPLETE THE ORDERS BELOW AND   FAX TO [facility]: [phone]  If you are unable to process this request in 24 hours please contact our office:   [phone]  []  I have read the above report and request that my patient continue as recommended. []  I have read the above report and request that my patient continue therapy with the following changes/special instructions:________________________________________  []I have read the above report and request that my patient be discharged from therapy.     Physicians signature: ______________________________Date: ______Time:______

## 2017-04-11 ENCOUNTER — HOSPITAL ENCOUNTER (OUTPATIENT)
Dept: PHYSICAL THERAPY | Age: 57
Discharge: HOME | End: 2017-04-11
Payer: COMMERCIAL

## 2017-04-11 PROCEDURE — 97035 APP MDLTY 1+ULTRASOUND EA 15: CPT

## 2017-04-11 PROCEDURE — 97140 MANUAL THERAPY 1/> REGIONS: CPT

## 2017-04-11 PROCEDURE — 97110 THERAPEUTIC EXERCISES: CPT

## 2017-04-11 NOTE — PROGRESS NOTES
PT DAILY TREATMENT NOTE     Patient Name: Cristofer Kou  Date:2017  : 1960  [x]  Patient  Verified  Payor: BLUE CROSS / Plan:  [facility] / Product Type: PPO /    In time:3:30  Out time:4:30  Total Treatment Time (min): 60  Visit #: 8 of 8    Treatment Area: Dorsalgia, unspecified [M54.9]    SUBJECTIVE  Pain Level (0-10 scale): 3-4/10  Any medication changes, allergies to medications, adverse drug reactions, diagnosis change, or new procedure performed?: [x] No    [] Yes (see summary sheet for update)  Subjective functional status/changes:   [] No changes reported  \"I still feel a lot of pressure in my LB. \"    OBJECTIVE    Modality rationale: decrease pain and increase tissue extensibility to improve the patients ability to perform ADLs without difficulty. Min Type Additional Details    - Estim:  -Unatt       -IFC  -Premod                        -Other:  -w/ice   -w/heat  Position:  Location:    - Estim: -Att    -TENS instruct  -NMES                    -Other:  -w/US   -w/ice   -w/heat  Position:  Location:    -  Traction: - Cervical       -Lumbar                       - Prone          -Supine                       -Intermittent   -Continuous Lbs:  - before manual  - after manual   8 X  Ultrasound: XContinuous   - Pulsed                            X 1MHz   -3MHz W/cm2:1.2  Location:right LB in seated position.    -  Iontophoresis with dexamethasone         Location: - Take home patch   - In clinic    -  Ice     -  heat  -  Ice massage  -  Laser   -  Anodyne Position:  Location:    -  Laser with stim  -  Other:  Position:  Location:    -  Vasopneumatic Device Pressure:       - lo - med - hi   Temperature: - lo - med - hi   - Skin assessment post-treatment:  -intact -redness- no adverse reaction    -redness  adverse reaction:    53 min Manual Therapy:  MET to correct right post rotated ilium, R/L BST,  R post. Rotated vertebrae at T6.    Rationale: decrease pain, increase ROM and increase tissue extensibility to perform ADLs without difficulty. With   [] TE   [] TA   [] neuro   [] other: Patient Education: [x] Review HEP    [] Progressed/Changed HEP based on:   [] positioning   [] body mechanics   [] transfers   [] heat/ice application    [] other:      Other Objective/Functional Measures:      Pain Level (0-10 scale) post treatment: 3-4/10    ASSESSMENT/Changes in Function: Addressed alignment issue first due to increase pain. Therapist had to make several ilium corrections due to patient not staying in alignment when getting up from supine to sit position. Advised patient to try ice on right LB/sh and heat to relieve muscle tension in his thoracic spine. Pt continued to have pain after therapy. Patient will continue to benefit from skilled PT services to address functional mobility deficits, address ROM deficits, address strength deficits and analyze and address soft tissue restrictions to attain remaining goals. []  See Plan of Care  []  See progress note/recertification  []  See Discharge Summary         Progress towards goals / Updated goals:  Short Term Goals: To be accomplished in 1 weeks:  1. Pt will report compliance and independence to HEP to help the pt manage their pain and symptoms. Eval: Established   Current: MET. Pt reports compliance. Long Term Goals: To be accomplished in 4 weeks:  1. Pt will report minimal to no pain in the low back upon standing from a chair to improve ease of transfers. Eval: reports increased LBP upon standing   Current:No change. Pt continues to report increased pain upon standing. 2. Pt will increase AROM lumbar EXT to % of WNL, right SB and rotation to WNL with minimal to no pain to improve ability to tolerate functional tasks. Eval: 50-75% of WNL with increased pressure/LBP  Current: No change. 50-75% of WNL with increased pressure/LBP  3.  Pt will report being able to walk for 10 mins with minimal to no increase in low back pain to improve walking ability in the community. Eval: reports LBP immediately with walking  Current:Not met.  Pt reports pain immediatley upon standing.     PLAN  []  Upgrade activities as tolerated     [x]  Continue plan of care  []  Update interventions per flow sheet       []  Discharge due to:_  []  Other:_      Shahid Lopez PTA 4/11/2017  4:40 PM    Future Appointments  Date Time Provider Neli Ragsdale   4/13/2017 3:00 PM Shahid Lopez PTA [facility]   4/13/2017 3:30 PM Shahid Lopez, ERIN [facility]   4/18/2017 3:00 PM Shahid Lopez, ERIN [facility]   4/18/2017 3:30 PM Shahid Lopez PTA [facility]   4/20/2017 3:00 PM Shahid Lopez, ERIN [facility]   4/20/2017 3:30 PM Shahid Lopez, PTA [facility]   4/25/2017 3:00 PM Shahid Lopez, PTA [facility]   4/25/2017 3:30 PM Shahid Lopez, ERIN [facility]   4/27/2017 3:00 PM Shahid Lopez, PTA [facility]   4/27/2017 3:30 PM Shahid Lopez, PTA [facility]

## 2017-04-11 NOTE — PROGRESS NOTES
PT DAILY TREATMENT NOTE     Patient Name: Leoncio Corbett  Date:2017  : 1960  [x]  Patient  Verified  Payor: BLUE CROSS / Plan:  [facility] / Product Type: PPO /    In time:2:55  Out time:3:30  Total Treatment Time (min): 35  Visit #: 6 of 8    Treatment Area: Pain in left shoulder [M25.512]    SUBJECTIVE  Pain Level (0-10 scale): 0/10  Any medication changes, allergies to medications, adverse drug reactions, diagnosis change, or new procedure performed?: [x] No    [] Yes (see summary sheet for update)  Subjective functional status/changes:   [] No changes reported  \"My shoulder feels okay it my back that hurts. \"    OBJECTIVE    25 min Therapeutic Exercise:  [] See flow sheet :   Rationale: increase ROM to improve the patients ability to perform ADLs without difficulty. 10 min Manual Therapy:  Left STJ mobes, TPR release left UT, left shoulder PROM into flex, gentle long axis distraction left shoulder   Rationale: decrease pain, increase ROM and increase tissue extensibility to perform ADLs without difficulty. With   [] TE   [] TA   [] neuro   [] other: Patient Education: [x] Review HEP    [] Progressed/Changed HEP based on:   [] positioning   [] body mechanics   [] transfers   [] heat/ice application    [] other:      Other Objective/Functional Measures:      Pain Level (0-10 scale) post treatment: 1/10    ASSESSMENT/Changes in Function: Continued with current ex. Per flow sheet. Pt reported  Some discomfort with manual.  Pt's PROM continues to improve with pain at end range. Patient will continue to benefit from skilled PT services to address functional mobility deficits and address ROM deficits to attain remaining goals. []  See Plan of Care  []  See progress note/recertification  []  See Discharge Summary         Progress towards goals / Updated goals:  1. Pt will increase FOTO score to 56 points to improve ability to perform ADLs.   PN: 37 points   Current: Regression: 35, no reason given. 2. Pt will report a decrease in at worst pain to 5/10 to improve activity tolerance. PN: at worst 6-7/10 at night  Current: MET. Pt reports worst pain level 3/10. 3. Pt will increase PROM left shoulder flex to at least 130 degs, ER (at neutral ABD) to 30 degs, IR (at neutral ABD) to 50 degs to improve ability to progress through protocol with more ease. PN: AAROM flex 95 degs (visual observation) on pulleys. Current Progressing.: AAROM flex 155 degs (visual observation) on pulleys. ER (at neutral ABD) 25 degs, IR (at neutral ABD) 45 degs.   PLAN  [x]  Upgrade activities as tolerated     [x]  Continue plan of care  []  Update interventions per flow sheet       []  Discharge due to:_  []  Other:_      María Ramirez PTA 4/11/2017  4:51 PM    Future Appointments  Date Time Provider Neli Ragsdale   4/13/2017 3:00 PM María Ramirez PTA [facility]   4/13/2017 3:30 PM María Ramirez PTA [facility]   4/18/2017 3:00 PM María Ramirez PTA [facility]   4/18/2017 3:30 PM María Ramirez PTA [facility]   4/20/2017 3:00 PM María Ramirez PTA [facility]   4/20/2017 3:30 PM María Ramirez PTA [facility]   4/25/2017 3:00 PM María Ramirez PTA [facility]   4/25/2017 3:30 PM María Ramirez PTA [facility]   4/27/2017 3:00 PM María Ramirez PTA [facility]   4/27/2017 3:30 PM María Ramirez PTA [facility]

## 2017-04-12 NOTE — PROGRESS NOTES
[facility] at [address] [facility]  [address]  Phone: [phone]      Fax:  [phone]    Progress Note  Patient name: Hulen Severe Start of Care: 2017   Referral source: Cindy Villagomez MD : 1960    Medical Diagnosis: Pain in left shoulder [M25.512] Onset Date:DOS 2017    Treatment Diagnosis: pain in left shoulder s/p left shoulder arthroscope SAD, RCR   Prior Hospitalization: see medical history Provider#: [id number]   Medications: Verified on Patient summary List   Comorbidities: right RCR repair 3/8/2016, left TKR , HTN, arthritis  Prior Level of Function: Independent with ADLs, functional, and work tasks with pain in the left shoulder before the surgery. Visits from Start of Care: 14    Missed Visits: 2    Established Goals:          Excellent Good         Limited           None  [x] Increased ROM   []  [x]  []  []  [] Increased Strength  []  []  []  []  [] Increased Mobility  []  []  []  []   [x] Decreased Pain   []  [x]  []  []  [] Decreased Swelling  []  []  []  []    Key Functional Changes:   Functional Gains: \"ROM, pain\"  Functional Deficits: \"ROM\"  % improvement: 20%  Pain Average: 2/10  Best: 0/10  Worst: 3/10  Patient Goal: \"No pain and be able to get back in the gym. \"     Current goals:  1. Pt will increase FOTO score to 56 points to improve ability to perform ADLs. PN: 37 points   Current: Regression: 35, no reason given. 2. Pt will report a decrease in at worst pain to 5/10 to improve activity tolerance. PN: at worst 6-7/10 at night  Current: MET. Pt reports worst pain level 3/10. 3. Pt will increase PROM left shoulder flex to at least 130 degs, ER (at neutral ABD) to 30 degs, IR (at neutral ABD) to 50 degs to improve ability to progress through protocol with more ease. PN: AAROM flex 95 degs (visual observation) on pulleys. Current Progressing: AAROM flex 155 degs (visual observation) on pulleys.  ER (at neutral ABD) 25 degs, IR (at neutral ABD) 45 degs. Updated Goals: to be achieved in 4 weeks:  1. Pt will increase FOTO score to 56 points to improve ability to perform ADLs. PN:  Regression: 35, no reason given. 2. Pt will increase AROM left shoulder flex to at least 100 degs to improve ability to progress through protocol with more ease. PN: not AROM at this time  3. Pt will increase PROM left shoulder ER (at neutral ABD) to 30 degs, IR to 50 degs to improve PROM needed to progress through protocol  PN: ER (at neutral ABD) 25 degs, IR (at neutral ABD) 45 degs. ASSESSMENT/RECOMMENDATIONS:  Pt has demonstrated improvements in ROM and pain since starting therapy. Pt has not received clearance from the MD to progress to AROM at this time. Please advise when we can progress pt to AROM. We will continue therapy to further improve ROM, pain and overall mobility to help the pt perform functional tasks with more ease. [x]Continue therapy per initial plan/protocol at a frequency of  2 x per week for 4 weeks  []Continue therapy with the following recommended changes:_____________________      _____________________________________________________________________  []Discontinue therapy progressing towards or have reached established goals  []Discontinue therapy due to lack of appreciable progress towards goals  []Discontinue therapy due to lack of attendance or compliance  []Await Physician's recommendations/decisions regarding therapy  []Other:________________________________________________________________    Thank you for this referral.   Angel Kaur, PT 4/12/2017 9:19 AM  NOTE TO PHYSICIAN:  PLEASE COMPLETE THE ORDERS BELOW AND   [facility]: [phone]  If you are unable to process this request in 24 hours please contact our office:   [phone]  []  I have read the above report and request that my patient continue as recommended.   []  I have read the above report and request that my patient continue therapy with the following changes/special instructions:________________________________________  []I have read the above report and request that my patient be discharged from therapy.     Physicians signature: ______________________________Date: ______Time:______

## 2017-04-13 ENCOUNTER — HOSPITAL ENCOUNTER (OUTPATIENT)
Dept: PHYSICAL THERAPY | Age: 57
Discharge: HOME | End: 2017-04-13
Payer: COMMERCIAL

## 2017-04-13 ENCOUNTER — HOSPITAL ENCOUNTER (OUTPATIENT)
Dept: PHYSICAL THERAPY | Age: 57
End: 2017-04-13
Payer: COMMERCIAL

## 2017-04-13 PROCEDURE — 97140 MANUAL THERAPY 1/> REGIONS: CPT

## 2017-04-13 PROCEDURE — 97110 THERAPEUTIC EXERCISES: CPT

## 2017-04-13 NOTE — PROGRESS NOTES
PT DAILY TREATMENT NOTE     Patient Name: Valerie Boxer  Date:2017  : 1960  [x]  Patient  Verified  Payor: BLUE CROSS / Plan:  [facility] / Product Type: PPO /    In time:3:00  Out time:3:40  Total Treatment Time (min): 40  Visit #: 1 of 8    Treatment Area: Pain in left shoulder [M25.512]    SUBJECTIVE  Pain Level (0-10 scale): 2/10  Any medication changes, allergies to medications, adverse drug reactions, diagnosis change, or new procedure performed?: [x] No    [] Yes (see summary sheet for update)  Subjective functional status/changes:   [] No changes reported  \"MD said no change in therapy yet. \"    OBJECTIVE    30 min Therapeutic Exercise:  [] See flow sheet :   Rationale: increase ROM and increase strength to improve the patients ability to perform ADLs without difficulty. 10 min Manual Therapy:  Left STJ mobes, TPR release left UT, left shoulder PROM into flex, gentle long axis distraction left shoulder   Rationale: decrease pain, increase ROM and increase tissue extensibility to perform ADLs without difficulty. With   [] TE   [] TA   [] neuro   [] other: Patient Education: [x] Review HEP    [] Progressed/Changed HEP based on:   [] positioning   [] body mechanics   [] transfers   [] heat/ice application    [] other:      Other Objective/Functional Measures:      Pain Level (0-10 scale) post treatment: 1/10    ASSESSMENT/Changes in Function: Continued with current ex. Per flow sheet. Pt reported some discomfort with manual but less pain after therapy. Patient will continue to benefit from skilled PT services to address functional mobility deficits, address ROM deficits and address strength deficits to attain remaining goals. []  See Plan of Care  []  See progress note/recertification  []  See Discharge Summary         Progress towards goals / Updated goals:  1. Pt will increase FOTO score to 56 points to improve ability to perform ADLs.   PN: Regression: 35, no reason given. 2. Pt will increase AROM left shoulder flex to at least 100 degs to improve ability to progress through protocol with more ease. PN: not AROM at this time  3. Pt will increase PROM left shoulder ER (at neutral ABD) to 30 degs, IR to 50 degs to improve PROM needed to progress through protocol  PN: ER (at neutral ABD) 25 degs, IR (at neutral ABD) 45 degs.     PLAN  [x]  Upgrade activities as tolerated     [x]  Continue plan of care  []  Update interventions per flow sheet       []  Discharge due to:_  []  Other:_      Deanne Hidalgo PTA 4/13/2017  4:51 PM    Future Appointments  Date Time Provider Neli Ragsdale   4/18/2017 3:00 PM Deanne Hidalgo PTA [facility]   4/20/2017 3:00 PM Deanne Hidalgo PTA [facility]   4/25/2017 3:00 PM Deanne Hidalgo PTA [facility]   4/27/2017 3:00 PM Deanne Hidalgo PTA [facility]   5/2/2017 3:00 PM Deanne Hidalgo PTA [facility]   5/4/2017 3:00 PM Deanne Hidalgo PTA [facility]   5/9/2017 3:00 PM Deanne Hidalgo PTA [facility]   5/11/2017 3:00 PM Deanne Hidalgo PTA [facility]   5/16/2017 3:00 PM Deanne Hidalgo PTA [facility]   5/18/2017 3:00 PM Deanne Hidalgo PTA [facility]   5/23/2017 3:00 PM Deanne Hidalgo PTA [facility]   5/25/2017 3:00 PM Deanne Hidalgo PTA [facility]   5/30/2017 3:00 PM Deanne Hidalgo PTA [facility]

## 2017-04-18 ENCOUNTER — APPOINTMENT (OUTPATIENT)
Dept: PHYSICAL THERAPY | Age: 57
End: 2017-04-18
Payer: COMMERCIAL

## 2017-04-20 ENCOUNTER — HOSPITAL ENCOUNTER (OUTPATIENT)
Dept: PHYSICAL THERAPY | Age: 57
Discharge: HOME | End: 2017-04-20
Payer: COMMERCIAL

## 2017-04-20 ENCOUNTER — APPOINTMENT (OUTPATIENT)
Dept: PHYSICAL THERAPY | Age: 57
End: 2017-04-20
Payer: COMMERCIAL

## 2017-04-20 PROCEDURE — 97140 MANUAL THERAPY 1/> REGIONS: CPT

## 2017-04-20 PROCEDURE — 97110 THERAPEUTIC EXERCISES: CPT

## 2017-04-20 NOTE — PROGRESS NOTES
PT DAILY TREATMENT NOTE     Patient Name: Valerie Boxer  Date:2017  : 1960  [x]  Patient  Verified  Payor: BLUE CROSS / Plan:  [facility] / Product Type: PPO /    In time:3:00  Out time:3:50  Total Treatment Time (min): 50  Visit #: 2 of 8    Treatment Area: Pain in left shoulder [M25.512]    SUBJECTIVE  Pain Level (0-10 scale): 0/10  Any medication changes, allergies to medications, adverse drug reactions, diagnosis change, or new procedure performed?: [x] No    [] Yes (see summary sheet for update)  Subjective functional status/changes:   [] No changes reported  \"My shoulder is good, my back is whats hurting. \"    OBJECTIVE    Modality rationale: decrease inflammation and decrease pain to improve the patients ability to perform ADLs without difficulty.    Min Type Additional Details    [] Estim:  []Unatt       []IFC  []Premod                        []Other:  []w/ice   []w/heat  Position:  Location:    [] Estim: []Att    []TENS instruct  []NMES                    []Other:  []w/US   []w/ice   []w/heat  Position:  Location:    []  Traction: [] Cervical       []Lumbar                       [] Prone          []Supine                       []Intermittent   []Continuous Lbs:  [] before manual  [] after manual    []  Ultrasound: []Continuous   [] Pulsed                           []1MHz   []3MHz W/cm2:  Location:    []  Iontophoresis with dexamethasone         Location: [] Take home patch   [] In clinic   10 [x]  Ice     [x]  heat  []  Ice massage  []  Laser   []  Anodyne Position:  Location:CP-Left sh.  MH-LB    []  Laser with stim  []  Other:  Position:  Location:    []  Vasopneumatic Device Pressure:       [] lo [] med [] hi   Temperature: [] lo [] med [] hi   [x] Skin assessment post-treatment:  [x]intact [x]redness- no adverse reaction    []redness  adverse reaction:   30 min Therapeutic Exercise:  [] See flow sheet :   Rationale: increase ROM and increase strength to improve the patients ability to perform ADLs without difficulty. 10 min Manual Therapy:  Left STJ mobes, TPR release left UT, left shoulder PROM into flex, gentle long axis distraction left shoulder   Rationale: decrease pain, increase ROM and increase tissue extensibility to perform ADLs without difficulty. With   [] TE   [] TA   [] neuro   [] other: Patient Education: [x] Review HEP    [] Progressed/Changed HEP based on:   [] positioning   [] body mechanics   [] transfers   [] heat/ice application    [] other:      Other Objective/Functional Measures:      Pain Level (0-10 scale) post treatment: 1-2/10    ASSESSMENT/Changes in Function: Continued with current ex. Per flow sheet. Pt reported some pain and discomfort with manual and with wand ex. But was able to perform. Patient will continue to benefit from skilled PT services to modify and progress therapeutic interventions, address functional mobility deficits and address ROM deficits to attain remaining goals. []  See Plan of Care  []  See progress note/recertification  []  See Discharge Summary         Progress towards goals / Updated goals:  1. Pt will increase FOTO score to 56 points to improve ability to perform ADLs. PN: Regression: 35, no reason given. 2. Pt will increase AROM left shoulder flex to at least 100 degs to improve ability to progress through protocol with more ease. PN: not AROM at this time  3. Pt will increase PROM left shoulder ER (at neutral ABD) to 30 degs, IR to 50 degs to improve PROM needed to progress through protocol  PN: ER (at neutral ABD) 25 degs, IR (at neutral ABD) 45 degs.     PLAN  []  Upgrade activities as tolerated     [x]  Continue plan of care  []  Update interventions per flow sheet       []  Discharge due to:_  []  Other:_      Mamadou Ureña PTA 4/20/2017  1:56 PM    Future Appointments  Date Time Provider Neli Ragsdale   4/20/2017 3:00 PM Mamadou Ureña PTA [place] [facility]   4/25/2017 3:00 PM Tori De Leon Leo Ku, [facility]   4/27/2017 3:00 PM Georganne Peel, [facility]   5/2/2017 3:00 PM Georganne Peel, [facility]   5/4/2017 3:00 PM Georganne Peel, [facility]   5/9/2017 3:00 PM Georganne Peel, [facility]   5/11/2017 3:00 PM Georganne Peel, PTA [facility]   5/16/2017 3:00 PM Georganne Peel, [facility]   5/18/2017 3:00 PM Georganne Peel, [facility]   5/23/2017 3:00 PM Georganne Peel, [facility]   5/25/2017 3:00 PM Georganne Peel, [facility]   5/30/2017 3:00 PM Georganne Peel, [facility]

## 2017-04-25 ENCOUNTER — HOSPITAL ENCOUNTER (OUTPATIENT)
Dept: PHYSICAL THERAPY | Age: 57
Discharge: HOME | End: 2017-04-25
Payer: COMMERCIAL

## 2017-04-25 ENCOUNTER — APPOINTMENT (OUTPATIENT)
Dept: PHYSICAL THERAPY | Age: 57
End: 2017-04-25
Payer: COMMERCIAL

## 2017-04-25 PROCEDURE — 97110 THERAPEUTIC EXERCISES: CPT

## 2017-04-25 PROCEDURE — 97140 MANUAL THERAPY 1/> REGIONS: CPT

## 2017-04-27 ENCOUNTER — APPOINTMENT (OUTPATIENT)
Dept: PHYSICAL THERAPY | Age: 57
End: 2017-04-27
Payer: COMMERCIAL

## 2017-04-27 ENCOUNTER — HOSPITAL ENCOUNTER (OUTPATIENT)
Dept: PHYSICAL THERAPY | Age: 57
Discharge: HOME | End: 2017-04-27
Payer: COMMERCIAL

## 2017-04-27 PROCEDURE — 97140 MANUAL THERAPY 1/> REGIONS: CPT

## 2017-04-27 PROCEDURE — 97110 THERAPEUTIC EXERCISES: CPT

## 2017-05-02 ENCOUNTER — HOSPITAL ENCOUNTER (OUTPATIENT)
Dept: PHYSICAL THERAPY | Age: 57
Discharge: HOME | End: 2017-05-02
Payer: COMMERCIAL

## 2017-05-02 PROCEDURE — 97110 THERAPEUTIC EXERCISES: CPT

## 2017-05-02 PROCEDURE — 97140 MANUAL THERAPY 1/> REGIONS: CPT

## 2017-05-02 NOTE — PROGRESS NOTES
PT DAILY TREATMENT NOTE     Patient Name: Bibi Sebastian  Date:2017  : 1960  [x]  Patient  Verified  Payor: BLUE CROSS / Plan:  [facility] / Product Type: PPO /    In time:3:00  Out time:3:55  Total Treatment Time (min): 55  Visit #: 4 of 8    Treatment Area: Pain in left shoulder [M25.512]    SUBJECTIVE  Pain Level (0-10 scale): 0/10  Any medication changes, allergies to medications, adverse drug reactions, diagnosis change, or new procedure performed?: [x] No    [] Yes (see summary sheet for update)  Subjective functional status/changes:   [] No changes reported  \"Shoulder feels okay. \"    OBJECTIVE    45 min Therapeutic Exercise:  [] See flow sheet :   Rationale: increase ROM and increase strength to improve the patients ability to perform ADLs without difficulty. 10 min Manual Therapy:  Left STJ mobes, TPR release left UT, left shoulder PROM into flex, gentle long axis distraction left shoulder   Rationale: decrease pain, increase ROM and increase tissue extensibility to perform ADls without difficulty. With   [] TE   [] TA   [] neuro   [] other: Patient Education: [x] Review HEP    [] Progressed/Changed HEP based on:   [] positioning   [] body mechanics   [] transfers   [] heat/ice application    [] other:      Other Objective/Functional Measures:      Pain Level (0-10 scale) post treatment: 0/10    ASSESSMENT/Changes in Function: Continued with progressed ex. Per flow sheet. Pt reported some discomfort with ex. But was able to perform. Patient will continue to benefit from skilled PT services to address functional mobility deficits, address ROM deficits, address strength deficits and analyze and address soft tissue restrictions to attain remaining goals. []  See Plan of Care  []  See progress note/recertification  []  See Discharge Summary         Progress towards goals / Updated goals:  1.  Pt will increase FOTO score to 56 points to improve ability to perform ADLs.  PN: Regression: 35, no reason given. 2. Pt will increase AROM left shoulder flex to at least 100 degs to improve ability to progress through protocol with more ease. PN: not AROM at this time  3. Pt will increase PROM left shoulder ER (at neutral ABD) to 30 degs, IR to 50 degs to improve PROM needed to progress through protocol  PN: ER (at neutral ABD) 25 degs, IR (at neutral ABD) 45 degs.     PLAN  [x]  Upgrade activities as tolerated     [x]  Continue plan of care  []  Update interventions per flow sheet       []  Discharge due to:_  []  Other:_      Donaldo Stearns PTA 5/2/2017  3:29 PM    Future Appointments  Date Time Provider Neli Ragsdale   5/4/2017 3:00 PM Monika Shah, PT [facility]   5/4/2017 3:30 PM Monika Shah, PT [facility]   5/9/2017 3:00 PM Donaldo Stearns PTA [facility]   5/11/2017 3:00 PM Donaldo Stearns PTA [facility]   5/16/2017 3:00 PM Donaldo Stearns PTA [facility]   5/18/2017 3:00 PM Donaldo Stearns PTA [facility]   5/23/2017 3:00 PM Donaldo Stearns PTA [facility]   5/25/2017 3:00 PM Donaldo Stearns PTA [facility]   5/30/2017 3:00 PM Donaldo Stearns PTA [facility]

## 2017-05-03 NOTE — PROGRESS NOTES
[facility] at [address] [facility] [address]  [address]  Phone: [phone]      Fax:  [phone]    Discharge Summary    Patient name: Stacy Stapleton Start of Care: 3/13/2017   Referral source: Ingrid Cha MD : 1960    Medical Diagnosis: Dorsalgia, unspecified [M54.9] Onset Date: 3 weeks ago    Treatment Diagnosis: Low back pain, neck pain   Prior Hospitalization: see medical history Provider#: [id number]   Medications: Verified on Patient summary List   Comorbidities: right RCR repair 3/8/2016, left TKR , HTN, arthritis, left RTC repair DOS 2017  Prior Level of Function: Independent with ADLs, functional, and work tasks with pain in the left shoulder before the surgery. Visits from Start of Care: 8    Missed Visits: 1  Reporting Period : 3/13/2017 to 2017    Assessment/ Summary of Care:   Pt was seen for 8 therapy sessions. Per telephone log, on 2017, pt was placed on hold until he saw a specialist. Pt received a new script from the specialist and is therefore d/c'ed from therapy at this time.      RECOMMENDATIONS:  [x]Discontinue therapy: []Patient has reached or is progressing toward set goals      [x]Patient is non-compliant or has abdicated      []Due to lack of appreciable progress towards set goals    Magnolia Libman, PT 5/3/2017 1:52 PM

## 2017-05-04 ENCOUNTER — HOSPITAL ENCOUNTER (OUTPATIENT)
Dept: PHYSICAL THERAPY | Age: 57
Discharge: HOME | End: 2017-05-04
Payer: COMMERCIAL

## 2017-05-04 PROCEDURE — 97140 MANUAL THERAPY 1/> REGIONS: CPT

## 2017-05-04 PROCEDURE — 97110 THERAPEUTIC EXERCISES: CPT

## 2017-05-04 PROCEDURE — 97162 PT EVAL MOD COMPLEX 30 MIN: CPT

## 2017-05-04 NOTE — PROGRESS NOTES
[facility] at [address] [facility]  Phone: [phone]      Fax:  [phone]    Plan of Care/ Statement of Necessity for Physical Therapy Services  Patient name: Manjinder Kuhn Start of Care: 2017   Referral source: Jade Nelson MD : 1960    Medical Diagnosis: Low back pain [M54.5]   Onset Date: worsening 2017    Treatment Diagnosis: LBP, right SIJ pain   Prior Hospitalization: see medical history Provider#: [id number]   Medications: Verified on Patient summary List   Comorbidities: right RCR repair 3/8/2016, left TKR , HTN, arthritis, left RTC repair DOS 2017  Prior Level of Function: Independent with ADLs, functional, and work tasks with pain in the left shoulder before the surgery. The Plan of Care and following information is based on the information from the initial evaluation. Assessment/ key information:   Pt is a 59year old male who presents to therapy today with low back pain and right SIJ pain. Pt was seen for therapy from 3/13/2017-2017 for LBP and was d/c'ed secondary to pt seeing a specialist for his low back. Pt states that he saw the specialist and is getting a Xray on his low back tomorrow 2017. Pt is currently being seen for therapy s/p left rotator cuff repair. Pt reports increased pain with standing and taking a few steps after performing a sit to stand transfer. Pt states that his pain is abolished when he is sitting. Pt also reports increased pain with lumbar EXT and with sitting \"upright\". Pt also states he has some \"numbness\" in the toes on B feet at times. Pt states that sometimes his pain is in his right buttock and may go into his right HS. Pt demonstrated decreased AROM, pain with palpation and bed mobility, impaired pelvic alignment, and increased lumbar lordosis.  Therapist is able to correct pt's pelvic alignment but this correction lasts temporarily (a few mins at most in the clinic today). Clavicular jump sign on the left was inconclusive secondary to pt's left shoulder surgery and on the right the pt had increased crepitus in the SC joint. We will trial therapy at this time to improve overall mobility and pelvic stability as we await imaging results. Pt may benefit from physical therapy to improve the above impairments to help the pt return to performing ADLs and functional activities. Evaluation Complexity History MEDIUM  Complexity : 1-2 comorbidities / personal factors will impact the outcome/ POC ; Examination MEDIUM Complexity : 3 Standardized tests and measures addressing body structure, function, activity limitation and / or participation in recreation  ;Presentation MEDIUM Complexity : Evolving with changing characteristics  ; Clinical Decision Making MEDIUM Complexity : FOTO score of 26-74  Overall Complexity Rating: MEDIUM  Problem List: pain affecting function, decrease ROM, decrease strength, impaired gait/ balance, decrease ADL/ functional abilitiies, decrease activity tolerance, decrease flexibility/ joint mobility and decrease transfer abilities   Treatment Plan may include any combination of the following: Therapeutic exercise, Therapeutic activities, Neuromuscular re-education, Physical agent/modality, Gait/balance training, Manual therapy, Patient education, Self Care training, Functional mobility training, Home safety training and Stair training  Patient / Family readiness to learn indicated by: asking questions, trying to perform skills and interest  Persons(s) to be included in education: patient (P)  Barriers to Learning/Limitations: None  Patient Goal (s): no pain  Patient Self Reported Health Status: good  Rehabilitation Potential: fair    Short Term Goals: To be accomplished in 1 weeks:  1. Pt will report compliance and independence to [facility] to help the pt manage their pain and symptoms. Long Term Doals: To be accomplished in 4 weeks:  1.  Pt will report a decrease in at worst pain to 7/10 in the low back to improve tolerance to standing. 2. Pt will increase AROM l/s EXT to > 75% of WNL, right SB to > 50% of WNL, and left rotation to > 75% of WNL with mild to no increased pain to improve ability to tolerate ADLs. 3. Pt will be able to take 5 steps after performing a sit to stand transfer from a chair with mild to no increased LBP to improve mobility in the home. Frequency / Duration: Patient to be seen 2 times per week for 4 weeks. Patient/ Caregiver education and instruction: Diagnosis, prognosis, self care, activity modification and exercises   [x]  Plan of care has been reviewed with ERIN Foreman, PT 5/4/2017 5:02 PM  _____________________________________________________________________  I certify that the above Therapy Services are being furnished while the patient is under my care. I agree with the treatment plan and certify that this therapy is necessary.     Physician's Signature:____________________  Date:__________Time:______    Please sign and return to [facility] at [address] [place] [address] [address]  Phone: [phone]      Fax:  [phone]

## 2017-05-04 NOTE — PROGRESS NOTES
PT DAILY TREATMENT NOTE 12    Patient Name: Bibi Sebastian  Date:2017  : 1960  [x]  Patient  Verified  Payor: BLUE CROSS / Plan: [facility] / Product Type: PPO /    In time: 3:58  Out time: 4:47  Total Treatment Time (min): 49  Visit #: 5 of 8    Treatment Area: Pain in left shoulder [M25.512]    SUBJECTIVE  Pain Level (0-10 scale): 1-2  Any medication changes, allergies to medications, adverse drug reactions, diagnosis change, or new procedure performed?: [x] No    [] Yes (see summary sheet for update)  Subjective functional status/changes:   [] No changes reported  \"Some pain today\"    OBJECTIVE    Modality rationale: decrease edema, decrease inflammation and decrease pain to improve the patients ability to tolerate ADLs   Min Type Additional Details    [] Estim:  []Unatt       []IFC  []Premod                        []Other:  []w/ice   []w/heat  Position:  Location:    [] Estim: []Att    []TENS instruct  []NMES                    []Other:  []w/US   []w/ice   []w/heat  Position:  Location:    []  Traction: [] Cervical       []Lumbar                       [] Prone          []Supine                       []Intermittent   []Continuous Lbs:  [] before manual  [] after manual    []  Ultrasound: []Continuous   [] Pulsed                           []1MHz   []3MHz Location:  W/cm2:    []  Iontophoresis with dexamethasone         Location: [] Take home patch   [] In clinic   8 [x]  Ice     []  heat  []  Ice massage  []  Laser   []  Anodyne Position: right s/l  Location: left shoulder    []  Laser with stim  []  Other: Position:  Location:    []  Vasopneumatic Device Pressure:       [] lo [] med [] hi   Temperature: [] lo [] med [] hi   [] Skin assessment post-treatment:  []intact []redness- no adverse reaction    []redness  adverse reaction:     26 min Therapeutic Exercise:  [x] See flow sheet :   Rationale: increase ROM and increase strength to improve the patients ability to perform ADLs 15 min Manual Therapy:  Left STJ mobes, left shoulder PROM into flex, gentle long axis distraction left shoulder, left clavicular mobility, left SC joint mobes (posterior)   Rationale: decrease pain, increase ROM and increase tissue extensibility to improve activity tolerance. With   [] TE   [] TA   [] neuro   [] other: Patient Education: [x] Review HEP    [] Progressed/Changed HEP based on:   [] positioning   [] body mechanics   [] transfers   [] heat/ice application    [] other:      Other Objective/Functional Measures: none taken    Pain Level (0-10 scale) post treatment: 2-3    ASSESSMENT/Changes in Function: reported some increased left shoulder with with s/l left shoulder ER. Held some exercises today secondary to TC. Reports pain at end range left shoulder flex. Continue POC as tolerated. Patient will continue to benefit from skilled PT services to modify and progress therapeutic interventions, address functional mobility deficits, address ROM deficits, address strength deficits, analyze and address soft tissue restrictions, analyze and cue movement patterns, analyze and modify body mechanics/ergonomics, assess and modify postural abnormalities and instruct in home and community integration to attain remaining goals. []  See Plan of Care  []  See progress note/recertification  []  See Discharge Summary         Progress towards goals / Updated goals:  1. Pt will increase FOTO score to 56 points to improve ability to perform ADLs. PN: Regression: 35, no reason given. 2. Pt will increase AROM left shoulder flex to at least 100 degs to improve ability to progress through protocol with more ease. PN: not AROM at this time  Current: MET AROM currently 5/4/2017  3. Pt will increase PROM left shoulder ER (at neutral ABD) to 30 degs, IR to 50 degs to improve PROM needed to progress through protocol  PN: ER (at neutral ABD) 25 degs, IR (at neutral ABD) 45 degs.     PLAN  [x]  Upgrade activities as tolerated     [x]  Continue plan of care  [x]  Update interventions per flow sheet       []  Discharge due to:_  []  Other:_      Ruth Salinas, PT 5/4/2017  3:29 PM    Future Appointments  Date Time Provider Neli Ragsdale   5/9/2017 3:00 PM Donna Rizzo PTA [facility]   5/11/2017 3:00 PM Donna Rizzo PTA [facility]   5/16/2017 3:00 PM Donna Rizzo PTA [facility]   5/18/2017 3:00 PM Donna Rizzo PTA [facility]   5/23/2017 3:00 PM Donna Rizzo PTA [facility]   5/25/2017 3:00 PM Donna Rizzo PTA [facility]   5/30/2017 3:00 PM Donna Rizzo PTA [facility]

## 2017-05-09 ENCOUNTER — APPOINTMENT (OUTPATIENT)
Dept: PHYSICAL THERAPY | Age: 57
End: 2017-05-09
Payer: COMMERCIAL

## 2017-05-11 ENCOUNTER — APPOINTMENT (OUTPATIENT)
Dept: PHYSICAL THERAPY | Age: 57
End: 2017-05-11
Payer: COMMERCIAL

## 2017-05-16 ENCOUNTER — HOSPITAL ENCOUNTER (OUTPATIENT)
Dept: PHYSICAL THERAPY | Age: 57
Discharge: HOME | End: 2017-05-16
Payer: COMMERCIAL

## 2017-05-16 PROCEDURE — 97110 THERAPEUTIC EXERCISES: CPT

## 2017-05-16 PROCEDURE — 97140 MANUAL THERAPY 1/> REGIONS: CPT

## 2017-05-16 NOTE — PROGRESS NOTES
PT DAILY TREATMENT NOTE     Patient Name: Jania Orf  Date:2017  : 1960  [x]  Patient  Verified  Payor: BLUE CROSS / Plan:  [facility] [place] / Product Type: PPO /    In time:2:30  Out time:3:25  Total Treatment Time (min): 55  Visit #: 7 of 8    Treatment Area: Pain in left shoulder [M25.512]    SUBJECTIVE  Pain Level (0-10 scale): 0/10  Any medication changes, allergies to medications, adverse drug reactions, diagnosis change, or new procedure performed?: [x] No    [] Yes (see summary sheet for update)  Subjective functional status/changes:   [] No changes reported  Pt reports no pain in shoulder today. OBJECTIVE  55 min Therapeutic Exercise:  [] See flow sheet :   Rationale: increase ROM and increase strength to improve the patients ability to perform ADLs without difficulty. With   [] TE   [] TA   [] neuro   [] other: Patient Education: [x] Review HEP    [] Progressed/Changed HEP based on:   [] positioning   [] body mechanics   [] transfers   [] heat/ice application    [] other:      Other Objective/Functional Measures: Functional Gains: \"decreased pain, I can use it more, strength\"  Functional Deficits: \"strength\"  % improvement: 85-90%  Pain   Average: 0/10       Best: 0/10     Worst: 2/10  Patient Goal: \"To get back to the gym. \"     Pain Level (0-10 scale) post treatment: 0/10    ASSESSMENT/Changes in Function: Progressed ex. Per flow sheet. Pt reported feeling challenged but was able to perform. Patient will continue to benefit from skilled PT services to address functional mobility deficits, address ROM deficits, address strength deficits and analyze and address soft tissue restrictions to attain remaining goals. []  See Plan of Care  []  See progress note/recertification  []  See Discharge Summary         Progress towards goals / Updated goals:  1. Pt will increase FOTO score to 56 points to improve ability to perform ADLs.   PN: Regression: 35, no reason given.  Current: MET. 73  2. Pt will increase AROM left shoulder flex to at least 100 degs to improve ability to progress through protocol with more ease. PN: not AROM at this time  Current: MET. Left shoulder AROM flexion 140 deg. 3. Pt will increase PROM left shoulder ER (at neutral ABD) to 30 degs, IR to 50 degs to improve PROM needed to progress through protocol  PN: ER (at neutral ABD) 25 degs, IR (at neutral ABD) 45 degs. Current: MET. Left shoulder AROM ER at neutral ABD AROM 25 deg, IR at neutral ABD 55 deg.     PLAN  [x]  Upgrade activities as tolerated     [x]  Continue plan of care  []  Update interventions per flow sheet       []  Discharge due to:_  [x]  Other:_ 2xs for 4 weeks     Shahid Lopez PTA 5/16/2017  3:07 PM    Future Appointments  Date Time Provider Neli Ragsdale   5/18/2017 3:00 PM Shahid Lopez PTA [facility]   5/18/2017 3:30 PM Shahid Lopez PTA [facility]   5/23/2017 3:00 PM Shahid Lopez PTA [facility]   5/23/2017 3:30 PM Shahid Lopez PTA [facility]   5/25/2017 3:00 PM Shahid Lopez PTA [facility]   5/25/2017 3:30 PM Shahid Lopez, ERIN [facility]   5/30/2017 3:00 PM Shahid Lopez PTA [facility]   5/30/2017 3:30 PM Shahid Lopez PTA [facility]

## 2017-05-16 NOTE — PROGRESS NOTES
PT DAILY TREATMENT NOTE     Patient Name: Tara Luis  Date:2017  : 1960  [x]  Patient  Verified  Payor: BLUE CROSS / Plan:  [facility] [employer] / Product Type: PPO /    In time:3:30  Out time:4:00  Total Treatment Time (min): 30  Visit #: 2 of 8    Treatment Area: Low back pain [M54.5]    SUBJECTIVE  Pain Level (0-10 scale): 3/10  Any medication changes, allergies to medications, adverse drug reactions, diagnosis change, or new procedure performed?: [x] No    [] Yes (see summary sheet for update)  Subjective functional status/changes:   [] No changes reported  \"Last week I could not walk. \"    OBJECTIVE    5 min Therapeutic Exercise:  [] See flow sheet :   Rationale: increase ROM and increase strength to improve the patients ability to perform ADLs without difficulty. 25 min Manual Therapy:  MET to correct R/L BST. Rationale: decrease pain and increase ROM to perform ADLs without difficulty. With   [] TE   [] TA   [] neuro   [] other: Patient Education: [x] Review HEP    [] Progressed/Changed HEP based on:   [] positioning   [] body mechanics   [] transfers   [] heat/ice application    [] other:      Other Objective/Functional Measures: FOTO 52     Pain Level (0-10 scale) post treatment: 1-2/10    ASSESSMENT/Changes in Function: pt reported a decrease in p! After manual today. Patient will continue to benefit from skilled PT services to modify and progress therapeutic interventions, address functional mobility deficits, address ROM deficits, address strength deficits and analyze and address soft tissue restrictions to attain remaining goals. []  See Plan of Care  []  See progress note/recertification  []  See Discharge Summary         Progress towards goals / Updated goals:  Short Term Goals: To be accomplished in 2 weeks:  1. Pt will report compliance and independence to HEP to help the pt manage their pain and symptoms.    Eval: will give pt HEP exercises NV  Current: MET.  Pt reports compliance   Long Term Doals: To be accomplished in 4 weeks:  1. Pt will report a decrease in at worst pain to 7/10 in the low back to improve tolerance to standing. Eval: 10/10   Current: Progressing. Pt reports worst pain 8-9/10  2. Pt will increase AROM l/s EXT to > 75% of WNL, right SB to > 50% of WNL, and left rotation to > 75% of WNL with mild to no increased pain to improve ability to tolerate ADLs. Eval: EXT 50-75% of WNL, right SB 25-50% of WNL, left rotation 50-75% of WNL with moderate increased LBP  3. Pt will be able to take 5 steps after performing a sit to stand transfer from a chair with mild to no increased LBP to improve mobility in the home.    Eval: Reports increased LBP when taking 5 steps after performing a sit to stand transfer    PLAN  [x]  Upgrade activities as tolerated     [x]  Continue plan of care  []  Update interventions per flow sheet       []  Discharge due to:_  []  Other:_      Foreign Landers PTA 5/16/2017  3:12 PM    Future Appointments  Date Time Provider Neli Ragsdale   5/18/2017 3:00 PM Foreign Landers PTA [facility]   5/18/2017 3:30 PM Foreign Landers PTA [facility]   5/23/2017 3:00 PM Foreign Landers PTA [facility]   5/23/2017 3:30 PM Foreign Landers PTA [facility]   5/25/2017 3:00 PM Foreign Landers PTA [facility]   5/25/2017 3:30 PM Foreign Landers PTA [facility]   5/30/2017 3:00 PM Foreign Landers PTA [facility]   5/30/2017 3:30 PM Foreign Landers PTA [facility]

## 2017-05-18 ENCOUNTER — APPOINTMENT (OUTPATIENT)
Dept: PHYSICAL THERAPY | Age: 57
End: 2017-05-18
Payer: COMMERCIAL

## 2017-05-22 NOTE — PROGRESS NOTES
[facility] [address] [facility]  [address]  Phone: [phone]      Fax:  [phone]    Progress Note  Patient name: Manjinder Kuhn Start of Care: 2017   Referral source: Noel Wei MD : 1960    Medical Diagnosis: Pain in left shoulder [M25.512] Onset Date:DOS 2017    Treatment Diagnosis: pain in left shoulder s/p left shoulder arthroscope SAD, RCR   Prior Hospitalization: see medical history Provider#: [id number]   Medications: Verified on Patient summary List   Comorbidities: right RCR repair 3/8/2016, left TKR , HTN, arthritis  Prior Level of Function: Independent with ADLs, functional, and work tasks with pain in the left shoulder before the surgery. Visits from Start of Care: 21    Missed Visits: 4    Established Goals:          Excellent Good         Limited           None  [x] Increased ROM   []  [x]  []  []  [] Increased Strength  []  []  []  []  [x] Increased Mobility  []  [x]  []  []   [x] Decreased Pain   []  [x]  []  []  [] Decreased Swelling  []  []  []  []    Key Functional Changes:   Functional Gains: \"decreased pain, I can use it more, strength\"  Functional Deficits: \"strength\"  % improvement: 85-90%  Pain Average: 0/10  Best: 0/10  Worst: 2/10  Patient Goal: \"To get back to the gym. \"     Current goals:  1. Pt will increase FOTO score to 56 points to improve ability to perform ADLs. PN: Regression: 35, no reason given. Current: MET. 73  2. Pt will increase AROM left shoulder flex to at least 100 degs to improve ability to progress through protocol with more ease. PN: not AROM at this time  Current: MET. Left shoulder AROM flexion 140 deg. 3. Pt will increase PROM left shoulder ER (at neutral ABD) to 30 degs, IR to 50 degs to improve PROM needed to progress through protocol  PN: ER (at neutral ABD) 25 degs, IR (at neutral ABD) 45 degs. Current: MET for IR.  Left shoulder AROM ER at neutral ABD AROM 25 deg, IR at neutral ABD 55 deg. Updated Goals: to be achieved in 4 weeks:  1. Pt will increase AROM left shoulder flex to [facility] to improve ability to progress through protocol with more ease. PN: 140 degs  2. Pt will improve left shoulder flex MMT to 3/5 to improve ease of performing functional tasks. PN: not assessed but pt continues to have limited AROM left shoulder flex  3. Pt will understand and be independent with final HEP when d/c'ed from therapy to improve pt's ability to tolerate ADLs around his home with more independence. PN: will create/finalize pt's final HEP when approaching d/c    ASSESSMENT/RECOMMENDATIONS:  Pt has demonstrated improvements in pain, AROM, and overall mobility of the left UE. Pt continues to report having decreased strength in the left UE and pt also continues to have decreased AROM of the left UE. Pt would benefit from continued therapy to further improve his current deficits to improve pt's ability to perform functional tasks with more ease and independence.    [x]Continue therapy per initial plan/protocol at a frequency of  2 x per week for 4 weeks  []Continue therapy with the following recommended changes:_____________________      _____________________________________________________________________  []Discontinue therapy progressing towards or have reached established goals  []Discontinue therapy due to lack of appreciable progress towards goals  []Discontinue therapy due to lack of attendance or compliance  []Await Physician's recommendations/decisions regarding therapy  []Other:________________________________________________________________    Thank you for this referral.   Ricardo Walker, PT 5/22/2017 12:38 PM  NOTE TO PHYSICIAN:  PLEASE COMPLETE THE ORDERS BELOW AND   FAX TO [facility]: [phone]  If you are unable to process this request in 24 hours please contact our office:   [phone]  []  I have read the above report and request that my patient continue as recommended. []  I have read the above report and request that my patient continue therapy with the following changes/special instructions:________________________________________  []I have read the above report and request that my patient be discharged from therapy.     Physicians signature: ______________________________Date: ______Time:______

## 2017-05-23 ENCOUNTER — HOSPITAL ENCOUNTER (OUTPATIENT)
Dept: PHYSICAL THERAPY | Age: 57
Discharge: HOME | End: 2017-05-23
Payer: COMMERCIAL

## 2017-05-23 PROCEDURE — 97110 THERAPEUTIC EXERCISES: CPT

## 2017-05-23 NOTE — PROGRESS NOTES
PT DAILY TREATMENT NOTE     Patient Name: Stacy Stapleton  Date:2017  : 1960  [x]  Patient  Verified  Payor: BLUE CROSS / Plan:  [facility] / Product Type: PPO /    In time:3:00  Out time:3:30  Total Treatment Time (min): 30  Visit #: 3 of 8    Treatment Area: Low back pain [M54.5]    SUBJECTIVE  Pain Level (0-10 scale): 2/10  Any medication changes, allergies to medications, adverse drug reactions, diagnosis change, or new procedure performed?: [x] No    [] Yes (see summary sheet for update)  Subjective functional status/changes:   [x] No changes reported      OBJECTIVE    30 min Therapeutic Exercise:  [] See flow sheet :   Rationale: increase ROM and increase strength to improve the patients ability to perform ADLs without difficulty. With   [] TE   [] TA   [] neuro   [] other: Patient Education: [x] Review HEP    [] Progressed/Changed HEP based on:   [] positioning   [] body mechanics   [] transfers   [] heat/ice application    [] other:      Other Objective/Functional Measures:      Pain Level (0-10 scale) post treatment: 0/10    ASSESSMENT/Changes in Function: Added ex. Per flow sheet. Pt reported feeling better after performing stretches. No p! Was reported after therapy. Patient will continue to benefit from skilled PT services to address functional mobility deficits, address ROM deficits, address strength deficits and analyze and address soft tissue restrictions to attain remaining goals. []  See Plan of Care  []  See progress note/recertification  []  See Discharge Summary         Progress towards goals / Updated goals:  Short Term Goals: To be accomplished in 2 weeks:  1. Pt will report compliance and independence to HEP to help the pt manage their pain and symptoms. Eval: will give pt HEP exercises NV  Current: MET. Pt reports compliance   Long Term Doals: To be accomplished in 4 weeks:  1.  Pt will report a decrease in at worst pain to 7/10 in the low back to improve tolerance to standing. Eval: 10/10   Current: Progressing. Pt reports worst pain 8-9/10  2. Pt will increase AROM l/s EXT to > 75% of WNL, right SB to > 50% of WNL, and left rotation to > 75% of WNL with mild to no increased pain to improve ability to tolerate ADLs. Eval: EXT 50-75% of WNL, right SB 25-50% of WNL, left rotation 50-75% of WNL with moderate increased LBP  3. Pt will be able to take 5 steps after performing a sit to stand transfer from a chair with mild to no increased LBP to improve mobility in the home.    Eval: Reports increased LBP when taking 5 steps after performing a sit to stand transfer     PLAN  [x]  Upgrade activities as tolerated     [x]  Continue plan of care  []  Update interventions per flow sheet       []  Discharge due to:_  []  Other:_      Brett Singh PTA 5/23/2017  3:53 PM    Future Appointments  Date Time Provider Neli Ragsdale   5/25/2017 3:00 PM Brett Singh PTA [facility]   5/25/2017 3:30 PM Brett Singh, ERIN [facility]   5/30/2017 3:00 PM Brett Singh PTA [facility]   5/30/2017 3:30 PM Brett Singh PTA [facility]

## 2017-05-23 NOTE — PROGRESS NOTES
PT DAILY TREATMENT NOTE     Patient Name: Elner Duverney  Date:2017  : 1960  [x]  Patient  Verified  Payor: BLUE CROSS / Plan:  [facility] / Product Type: PPO /    In time:3:30  Out time:4:00  Total Treatment Time (min): 30  Visit #: 1 of 8    Treatment Area: Pain in left shoulder [M25.512]    SUBJECTIVE  Pain Level (0-10 scale): 0/10  Any medication changes, allergies to medications, adverse drug reactions, diagnosis change, or new procedure performed?: [x] No    [] Yes (see summary sheet for update)  Subjective functional status/changes:   [] No changes reported  'My shoulder is good. \"    OBJECTIVE    30 min Therapeutic Exercise:  [] See flow sheet :   Rationale: increase strength to improve the patients ability to perform ADLs without difficulty. With   [] TE   [] TA   [] neuro   [] other: Patient Education: [x] Review HEP    [] Progressed/Changed HEP based on:   [] positioning   [] body mechanics   [] transfers   [] heat/ice application    [] other:      Other Objective/Functional Measures:      Pain Level (0-10 scale) post treatment: 0/10    ASSESSMENT/Changes in Function: Continued to progress ex. Per flow sheet. Pt reported feeling challenged with ex. But was able to perform. Patient will continue to benefit from skilled PT services to address functional mobility deficits, address ROM deficits, address strength deficits and analyze and address soft tissue restrictions to attain remaining goals. []  See Plan of Care  []  See progress note/recertification  []  See Discharge Summary         Progress towards goals / Updated goals:  1. Pt will increase AROM left shoulder flex to [facility] to improve ability to progress through protocol with more ease. PN: 140 degs  2. Pt will improve left shoulder flex MMT to 3/5 to improve ease of performing functional tasks. PN: not assessed but pt continues to have limited AROM left shoulder flex  3.  Pt will understand and be independent with final HEP when d/c'ed from therapy to improve pt's ability to tolerate ADLs around his home with more independence.    PN: will create/finalize pt's final HEP when approaching d/c    PLAN  [x]  Upgrade activities as tolerated     [x]  Continue plan of care  []  Update interventions per flow sheet       []  Discharge due to:_  []  Other:_      Ant Balderas PTA 5/23/2017  4:10 PM    Future Appointments  Date Time Provider Neli Ragsdale   5/25/2017 3:00 PM Ant Balderas PTA [facility]   5/25/2017 3:30 PM Ant Balderas PTA [facility]   5/30/2017 3:00 PM Ant Balderas PTA [facility]   5/30/2017 3:30 PM Ant Balderas PTA [facility]

## 2017-05-25 ENCOUNTER — HOSPITAL ENCOUNTER (OUTPATIENT)
Dept: PHYSICAL THERAPY | Age: 57
Discharge: HOME | End: 2017-05-25
Payer: COMMERCIAL

## 2017-05-25 PROCEDURE — 97112 NEUROMUSCULAR REEDUCATION: CPT

## 2017-05-25 PROCEDURE — 97110 THERAPEUTIC EXERCISES: CPT

## 2017-05-25 NOTE — PROGRESS NOTES
PT DAILY TREATMENT NOTE     Patient Name: Valentino Peraza  Date:2017  : 1960  [x]  Patient  Verified  Payor: BLUE CROSS / Plan:  [facility] / Product Type: PPO /    In time:3:20  Out time:3:55  Total Treatment Time (min): 35  Visit #: 2 of 8    Treatment Area: Pain in left shoulder [M25.512]    SUBJECTIVE  Pain Level (0-10 scale): 0/10  Any medication changes, allergies to medications, adverse drug reactions, diagnosis change, or new procedure performed?: [x] No    [] Yes (see summary sheet for update)  Subjective functional status/changes:   [] No changes reported  \"My shoulder feels good. \"    OBJECTIVE      35 min Therapeutic Exercise:  [] See flow sheet :   Rationale: increase strength to improve the patients ability to perform ADLs without difficulty. With   [] TE   [] TA   [] neuro   [] other: Patient Education: [x] Review HEP    [] Progressed/Changed HEP based on:   [] positioning   [] body mechanics   [] transfers   [] heat/ice application    [] other:      Other Objective/Functional Measures:      Pain Level (0-10 scale) post treatment: 0/10    ASSESSMENT/Changes in Function: Continued with strengthening ex. No increase in p! Was reported during or after therapy, just discomfort. Patient will continue to benefit from skilled PT services to address functional mobility deficits, address ROM deficits, address strength deficits and analyze and address soft tissue restrictions to attain remaining goals. []  See Plan of Care  []  See progress note/recertification  []  See Discharge Summary         Progress towards goals / Updated goals:  1. Pt will increase AROM left shoulder flex to [facility] to improve ability to progress through protocol with more ease. PN: 140 degs  2. Pt will improve left shoulder flex MMT to 3/5 to improve ease of performing functional tasks. PN: not assessed but pt continues to have limited AROM left shoulder flex  3.  Pt will understand and be independent with final HEP when d/c'ed from therapy to improve pt's ability to tolerate ADLs around his home with more independence.    PN: will create/finalize pt's final HEP when approaching d/c  PLAN  [x]  Upgrade activities as tolerated     [x]  Continue plan of care  []  Update interventions per flow sheet       []  Discharge due to:_  []  Other:_      Renee Cook PTA 5/25/2017  4:47 PM    Future Appointments  Date Time Provider Neli Ragsdale   5/30/2017 3:00 PM Renee Cook PTA [facility]   5[facility]30/2017 3:30 PM Renee Cook PTA [facility]   6/1/2017 2:30 PM Monisha Mcneal, PT [facility]   6/1/2017 3:00 PM Monisha Mcneal, PT [facility]   6/6/2017 2:00 PM Renee Cook PTA [facility]   6/6/2017 2:30 PM Renee Cook PTA [facility]   6/8/2017 2:30 PM Renee Cook PTA [facility]   6/8/2017 3:00 PM Renee Cook PTA [facility]   6/13/2017 2:30 PM Renee Cook PTA [facility]   6/13/2017 3:00 PM Renee Cook PTA [facility]   6/15/2017 2:30 PM Renee Cook PTA [facility]   6/15/2017 3:00 PM Renee Cook PTA [facility]   6/20/2017 2:30 PM Renee Cook PTA [facility]   6/20/2017 3:00 PM Renee Cook PTA [facility]   6/22/2017 2:30 PM Renee Cook PTA [facility]   6/22/2017 3:00 PM Renee Cook PTA [facility]   6/27/2017 3:00 PM Renee Cook PTA [facility]   6/27/2017 3:30 PM Renee Cook PTA [facility]   6/29/2017 3:00 PM Renee Cook PTA [facility]   6/29/2017 3:30 PM Renee Cook PTA [facility]

## 2017-05-25 NOTE — PROGRESS NOTES
PT DAILY TREATMENT NOTE     Patient Name: Leoncio Corbett  Date:2017  : 1960  [x]  Patient  Verified  Payor: BLUE CROSS / Plan:  [facility] / Product Type: PPO /    In time:3:00  Out time:3:20  Total Treatment Time (min): 20  Visit #: 4 of 8    Treatment Area: Low back pain [M54.5]    SUBJECTIVE  Pain Level (0-10 scale): 2/10  Any medication changes, allergies to medications, adverse drug reactions, diagnosis change, or new procedure performed?: [x] No    [] Yes (see summary sheet for update)  Subjective functional status/changes:   [] No changes reported  \"I'm hurting. \"    OBJECTIVE    10 min Therapeutic Exercise:  [] See flow sheet :   Rationale: increase ROM and increase strength to improve the patients ability to perform ADLs without difficulty. 10 min Neuromuscular Re-education:  []  See flow sheet :   Rationale: increase ROM and increase strength  to improve the patients ability to perform ADLs without difficulty. With   [] TE   [] TA   [] neuro   [] other: Patient Education: [x] Review HEP    [] Progressed/Changed HEP based on:   [] positioning   [] body mechanics   [] transfers   [] heat/ice application    [] other:      Other Objective/Functional Measures:      Pain Level (0-10 scale) post treatment: 0/10    ASSESSMENT/Changes in Function: Continued with current ex. Per flow sheet. Pt reported feeling better during and after therapy. Patient will continue to benefit from skilled PT services to address functional mobility deficits, address ROM deficits, address strength deficits and analyze and address soft tissue restrictions to attain remaining goals. []  See Plan of Care  []  See progress note/recertification  []  See Discharge Summary         Progress towards goals / Updated goals:  Short Term Goals: To be accomplished in 2 weeks:  1. Pt will report compliance and independence to HEP to help the pt manage their pain and symptoms.    Eval: will give pt HEP exercises NV  Current: MET. Pt reports compliance   Long Term Doals: To be accomplished in 4 weeks:  1. Pt will report a decrease in at worst pain to 7/10 in the low back to improve tolerance to standing. Eval: 10/10   Current: Progressing. Pt reports worst pain 8-9/10  2. Pt will increase AROM l/s EXT to > 75% of WNL, right SB to > 50% of WNL, and left rotation to > 75% of WNL with mild to no increased pain to improve ability to tolerate ADLs. Eval: EXT 50-75% of WNL, right SB 25-50% of WNL, left rotation 50-75% of WNL with moderate increased LBP  3. Pt will be able to take 5 steps after performing a sit to stand transfer from a chair with mild to no increased LBP to improve mobility in the home.    Eval: Reports increased LBP when taking 5 steps after performing a sit to stand transfer  PLAN  [x]  Upgrade activities as tolerated     [x]  Continue plan of care  []  Update interventions per flow sheet       []  Discharge due to:_  []  Other:_      Deanne Hidalgo PTA 5/25/2017  3:25 PM    Future Appointments  Date Time Provider Neli Ragsdale   5/25/2017 3:30 PM Deanne Hidalgo PTA [facility]   5/30/2017 3:00 PM Deanne Hidalgo PTA [facility]   5/30/2017 3:30 PM Deanne Hidalgo PTA [facility]

## 2017-05-30 ENCOUNTER — APPOINTMENT (OUTPATIENT)
Dept: PHYSICAL THERAPY | Age: 57
End: 2017-05-30
Payer: COMMERCIAL

## 2017-06-01 ENCOUNTER — HOSPITAL ENCOUNTER (OUTPATIENT)
Dept: PHYSICAL THERAPY | Age: 57
Discharge: HOME | End: 2017-06-01
Payer: COMMERCIAL

## 2017-06-01 PROCEDURE — 97110 THERAPEUTIC EXERCISES: CPT

## 2017-06-01 PROCEDURE — 97140 MANUAL THERAPY 1/> REGIONS: CPT

## 2017-06-01 NOTE — PROGRESS NOTES
PT DAILY TREATMENT NOTE     Patient Name: Elner Duverney  Date:2017  : 1960  [x]  Patient  Verified  Payor: BLUE CROSS / Plan:  [facility] / Product Type: PPO /    In time: 2:30  Out time: 3:15  Total Treatment Time (min): 45  Visit #: 5 of 8    Treatment Area: Low back pain [M54.5]    SUBJECTIVE  Pain Level (0-10 scale): 7 standing, 1 sitting  Any medication changes, allergies to medications, adverse drug reactions, diagnosis change, or new procedure performed?: [x] No    [] Yes (see summary sheet for update)  Subjective functional status/changes:   [] No changes reported  \"I went back to work Tuesday. It was OK, the pain increased in the afternoon. Yesterday and it pretty good. But I think I over did it yesterday. I woke up this morning and I knew it was going to be a bad day\". OBJECTIVE    30 min Therapeutic Exercise:  [x] See flow sheet :   Rationale: increase ROM and increase strength to improve the patients ability to perform ADLs    15 min Manual Therapy: MET to correct right posterior innominate, MET to correct right on left sacral torsion, right LE pull/distraction, pelvic and leg length assessment   Rationale: decrease pain, increase ROM, increase tissue extensibility and increase postural awareness to improve activity tolerance. With   [] TE   [] TA   [] neuro   [] other: Patient Education: [x] Review HEP    [] Progressed/Changed HEP based on:   [] positioning   [] body mechanics   [] transfers   [] heat/ice application    [] other:      Other Objective/Functional Measures: See goals below. MET to the right posterior innominate improved pelvic alignment but did not completely correct the pt's leg length.      Functional Gains: pain, mobility  Functional Deficits: deep dull pain on the right low back, especially after taking 5-10 steps  Pain       Best: 0/10 \"sitting\"     Worst: 8/10 \"with activity in the morning, stiffness and could not get it warmed up\"    Pain Level (0-10 scale) post treatment: 5 \"it increases the more I stand and walk\". ASSESSMENT/Changes in Function: See PN. After the pt's left shoulder therapy session, pt reported no LBP initially upon standing but reported increased pain in the low back as the pt was walking to the door to the parking lot. When the pt got to the door to the parking lot, the pt flexed forward to \"stretch\" and reported his pain decreased to a 2/10 in his low back. But the pt reported that the pain increased once he started walking again. Educated pt to perform forward bending exercises at home along with the stretches to help improve his symptoms. Pt reports overall improvement in pain and mobility since starting therapy. Pt continues to have no pain when sitting (on a plinth, chair, or on a SB) but reports increased pain in the right low back when standing/walking. Pt states that he thinks he over did it at work, which he thinks is why his LBP is more intense today. Before today's therapy session, pt's pelvic alignment was WNL for 2 consecutive therapy sessions and reports improved pain with stretching exercises. Pt also stated that (before this flare up) he was able to [facility] around with less LBP\". Pt reports he bought a stability ball for home to help with the core stability and flexibility. Educated pt to monitor his LBP over the next few days secondary to flare up today. We will continue therapy at this time to further improve pain, mobility, and flexibility to help pt perform functional and work tasks.      Patient will continue to benefit from skilled PT services to modify and progress therapeutic interventions, address functional mobility deficits, address ROM deficits, address strength deficits, analyze and address soft tissue restrictions, analyze and cue movement patterns, analyze and modify body mechanics/ergonomics, assess and modify postural abnormalities and instruct in home and community integration to attain remaining goals. []  See Plan of Care  []  See progress note/recertification  []  See Discharge Summary         Progress towards goals / Updated goals:  Short Term Goals: To be accomplished in 2 weeks:  1. Pt will report compliance and independence to HEP to help the pt manage their pain and symptoms. Eval: will give pt HEP exercises NV  Current: MET. Pt reports compliance 6/1/2017   Long Term Doals: To be accomplished in 4 weeks:  1. Pt will report a decrease in at worst pain to 7/10 in the low back to improve tolerance to standing. Eval: 10/10   Current: 8/10 6/1/2017  2. Pt will increase AROM l/s EXT to > 75% of WNL, right SB to > 50% of WNL, and left rotation to > 75% of WNL with mild to no increased pain to improve ability to tolerate ADLs. Eval: EXT 50-75% of WNL, right SB 25-50% of WNL, left rotation 50-75% of WNL with moderate increased LBP  Current: EXT 50-75% of WNL with moderate increased right-sided LBP, right SB WNL with when returning to neutral, left rotation % of WNL with pressure/mild LBP 6/1/2017  3. Pt will be able to take 5 steps after performing a sit to stand transfer from a chair with mild to no increased LBP to improve mobility in the home.    Eval: Reports increased LBP when taking 5 steps after performing a sit to stand transfer  Current: not met reports increased pain \"right away\" in the morning sometimes, other times its 5-10 steps 6/1/2017     PLAN  [x]  Upgrade activities as tolerated     [x]  Continue plan of care  [x]  Update interventions per flow sheet       []  Discharge due to:_  []  Other:_      Ruth Salinas, PT 6/1/2017  3:15 PM    Future Appointments  Date Time Provider Neli Ragsdale   6/1/2017 2:30 PM Ruth Salinas, PT [facility]   6/1/2017 3:00 PM Ruth Salinas, PT [facility]   6/6/2017 2:00 PM Donna Rizzo PTA [facility]   6/6/2017 2:30 PM Donna Rizzo PTA [facility]   6/8/2017 2:30 PM Donna Rizzo PTA [facility]   6/8/2017 3:00 PM Aracelis Segundo, ERIN [facility]   6/13/2017 2:30 PM Aracelis Segundo, ERIN [facility]   6/13/2017 3:00 PM Aracelis Segundo, PTA [facility]   6/15/2017 2:30 PM Aracelis Segundo, ERIN [facility]   6/15/2017 3:00 PM Aracelis Sgeundo, PTA [facility]   6/20/2017 2:30 PM Aracelis Segundo, ERIN [facility]   6/20/2017 3:00 PM Aracelis Segundo, ERIN [facility]   6/22/2017 2:30 PM Aracelis Segundo, ERIN [facility]   6/22/2017 3:00 PM Aracelis Segundo PTA [facility]   6/27/2017 3:00 PM Aracelis Segundo PTA [facility]   6/27/2017 3:30 PM Aracelis Segundo, [facility]   6/29/2017 3:00 PM Aracelis Segundo PTA [facility]   6/29/2017 3:30 PM Aracelis Segundo PTA [facility]

## 2017-06-01 NOTE — PROGRESS NOTES
[facility] at [address] [facility] [address]  [address]  Phone: [phone]      Fax:  [phone]    Progress Note  Patient name: Tamie Álvarez Start of Care: 2017   Referral source: Moriah Rodrigez MD : 1960    Medical Diagnosis: Low back pain [M54.5] Onset Date: worsening 2017    Treatment Diagnosis: LBP, right SIJ pain   Prior Hospitalization: see medical history Provider#: [id number]   Medications: Verified on Patient summary List   Comorbidities: right RCR repair 3/8/2016, left TKR , HTN, arthritis, left RTC repair DOS 2017  Prior Level of Function: Independent with ADLs, functional, and work tasks with pain in the left shoulder before the surgery. Visits from Start of Care: 5    Missed Visits: 2    Established Goals:          Excellent Good         Limited           None  [x] Increased ROM   []  [x]  []  []  [] Increased Strength  []  []  []  []  [x] Increased Mobility  []  [x]  []  []   [x] Decreased Pain   []  []  [x]  []  [] Decreased Swelling  []  []  []  []    Key Functional Changes:   Functional Gains: pain, mobility  Functional Deficits: deep dull pain on the right low back, especially after taking 5-10 steps  Pain  Best: 0/10 \"sitting\"  Worst: 8/10 \"with activity in the morning, stiffness and could not get it warmed up\"    After the pt's left shoulder therapy session today, the pt reported having no LBP initially upon standing but reported increased pain in the low back as the pt was walking to the door to the parking lot. When the pt got to the door to the parking lot, the pt flexed forward to \"stretch\" and reported his pain decreased to a 2/10 in his low back. But the pt reported that the pain increased once he started walking again. Educated pt to perform forward bending exercises at home along with the stretches to help improve his symptoms. Current goals:  Short Term Goals: To be accomplished in 2 weeks:  1.  Pt will report compliance and independence to HEP to help the pt manage their pain and symptoms. Eval: will give pt HEP exercises NV  Current: MET. Pt reports compliance 6/1/2017   Long Term Doals: To be accomplished in 4 weeks:  1. Pt will report a decrease in at worst pain to 7/10 in the low back to improve tolerance to standing. Eval: 10/10   Current: not met but progressing 8/10 6/1/2017  2. Pt will increase AROM l/s EXT to > 75% of WNL, right SB to > 50% of WNL, and left rotation to > 75% of WNL with mild to no increased pain to improve ability to tolerate ADLs. Eval: EXT 50-75% of WNL, right SB 25-50% of WNL, left rotation 50-75% of WNL with moderate increased LBP  Current: not met for EXT, EXT 50-75% of WNL with moderate increased right-sided LBP, right SB WNL with when returning to neutral, left rotation % of WNL with pressure/mild LBP 6/1/2017  3. Pt will be able to take 5 steps after performing a sit to stand transfer from a chair with mild to no increased LBP to improve mobility in the home. Eval: Reports increased LBP when taking 5 steps after performing a sit to stand transfer  Current: not met reports increased pain \"right away\" in the morning sometimes, other times its 5-10 steps 6/1/2017    Updated Goals: to be achieved in 3 weeks:  1. Pt will report a decrease in at worst pain to 7/10 in the low back to improve tolerance to standing. PN: ut progressing 8/10 6/1/2017  2. Pt will increase AROM l/s EXT to > 75% of WNL with mild to no increased pain to improve ability to tolerate ADLs. PN: EXT 50-75% of WNL with moderate increased right-sided LBP  3. Pt will be able to take 5 steps after performing a sit to stand transfer from a chair with mild to no increased LBP to improve mobility in the home. PN: reports increased pain \"right away\" in the morning sometimes, other times its after 5-10 steps 6/1/2017  4.  Pt will demonstrate WNL pelvic/SIJ alignment for 3 consecutive sessions to improve ease of performing work tasks. PN: to\wo consecutive sessions on 5/23/2017 and 5/25/2017 therapy sessions, but the right innominate was posteriorly rotated and right on left torsion was noted in the sacrum on 6/1/2017 therapy session. ASSESSMENT/RECOMMENDATIONS:  Pt reports overall improvement in pain and mobility since starting therapy. Pt continues to have no pain when sitting (on a plinth, chair, or on a SB) but reports increased pain in the right low back when standing/walking. Pt states that he thinks he over did it at work, which he thinks is why his LBP is more intense today (7/10 LBP pre session). Before today's therapy session, pt's pelvic alignment was WNL for 2 consecutive therapy sessions and reports improved pain with stretching exercises. Pt also stated that (before this flare up today) he was able to [facility] around with less LBP\". Pt reports he bought a stability ball for home to help with the core stability and flexibility. Educated pt to monitor his LBP over the next few days secondary to flare up today. We will continue therapy at this time to further improve pain, mobility, and flexibility to help pt perform functional and work tasks.    [x]Continue therapy per initial plan/protocol at a frequency of  2 x per week for 3 weeks  []Continue therapy with the following recommended changes:_____________________      _____________________________________________________________________  []Discontinue therapy progressing towards or have reached established goals  []Discontinue therapy due to lack of appreciable progress towards goals  []Discontinue therapy due to lack of attendance or compliance  []Await Physician's recommendations/decisions regarding therapy  []Other:________________________________________________________________    Thank you for this referral.   Yulissa Cristobal, PT 6/1/2017 3:19 PM  NOTE TO PHYSICIAN:  Mary Kinney [phone]   FAX TO [facility]: [phone]  If you are unable to process this request in 24 hours please contact our office:   [phone]  []  I have read the above report and request that my patient continue as recommended. []  I have read the above report and request that my patient continue therapy with the following changes/special instructions:________________________________________  []I have read the above report and request that my patient be discharged from therapy.     Physicians signature: ______________________________Date: ______Time:______

## 2017-06-01 NOTE — PROGRESS NOTES
PT DAILY TREATMENT NOTE     Patient Name: Valentino Peraza  Date:2017  : 1960  [x]  Patient  Verified  Payor: BLUE CROSS / Plan: [facility] / Product Type: PPO /    In time: 3:15  Out time: 3:50  Total Treatment Time (min): 35  Visit #: 3 of 8    Treatment Area: Pain in left shoulder [M25.512]    SUBJECTIVE  Pain Level (0-10 scale): 0  Any medication changes, allergies to medications, adverse drug reactions, diagnosis change, or new procedure performed?: [x] No    [] Yes (see summary sheet for update)  Subjective functional status/changes:   [] No changes reported  \"The shoulder is coming along. It catches sometimes and I can tell that it is still weak. My LBP is what is giving me issues today\"    OBJECTIVE    35 min Therapeutic Exercise:  [x] See flow sheet :   Rationale: increase strength to improve the patients ability to perform ADLs       With   [] TE   [] TA   [] neuro   [] other: Patient Education: [x] Review HEP    [] Progressed/Changed HEP based on:   [] positioning   [] body mechanics   [] transfers   [] heat/ice application    [] other:      Other Objective/Functional Measures: held some standing exercises today  secondary to pt's increased LBP while in standing. Pain Level (0-10 scale) post treatment: 0    ASSESSMENT/Changes in Function: Increased fatigue reported with s/l left shoulder flex to 90 degs and ER exercise with 1# weight but pt was able to perform. Performed tband and AROM (with 1# weight ) exercises with the pt sitting on a SB secondary to pt report of having increased LBP while standing. We will continue therapy at this time to further improve strength of the left shoulder to help the pt perform functional tasks with more ease.      Patient will continue to benefit from skilled PT services to modify and progress therapeutic interventions, address functional mobility deficits, address ROM deficits, address strength deficits, analyze and address soft tissue restrictions, analyze and cue movement patterns, analyze and modify body mechanics/ergonomics and assess and modify postural abnormalities to attain remaining goals. []  See Plan of Care  []  See progress note/recertification  []  See Discharge Summary         Progress towards goals / Updated goals:  1. Pt will increase AROM left shoulder flex to [place] to improve ability to progress through protocol with more ease. PN: 140 degs  2. Pt will improve left shoulder flex MMT to 3/5 to improve ease of performing functional tasks. PN: not assessed but pt continues to have limited AROM left shoulder flex  Current: continues to report fatigue with s/l left shoulder flex to 90 degs and ER exercise with 1# weight 6/1/2017  3. Pt will understand and be independent with final HEP when d/c'ed from therapy to improve pt's ability to tolerate ADLs around his home with more independence.    PN: will create/finalize pt's final HEP when approaching d/c     PLAN  [x]  Upgrade activities as tolerated     [x]  Continue plan of care  [x]  Update interventions per flow sheet       []  Discharge due to:_  []  Other:_      Brandon Floyd, PT 6/1/2017  3:43 PM    Future Appointments  Date Time Provider Neli Ragsdale   6/1/2017 2:30 PM Brandon Floyd, PT [facility]   6/1/2017 3:00 PM Brandon Floyd, PT [facility]   6/6/2017 2:00 PM Nikia Pena PTA [facility]   6/6/2017 2:30 PM Nikia Pena PTA [facility]   6/8/2017 2:30 PM Nikia Pena PTA [facility]   6/8/2017 3:00 PM Nikia Pena PTA [facility]   6/13/2017 2:30 PM Nikia Pena PTA [facility]   6/13/2017 3:00 PM Nikia Pena PTA [facility]   6/15/2017 2:30 PM Nikia Pena PTA [facility]   6/15/2017 3:00 PM Nikia Pena PTA [facility]   6/20/2017 2:30 PM Nikia Pena PTA [facility]   6/20/2017 3:00 PM Nikia Pena PTA [facility]   6/22/2017 2:30 PM Nikia Pena PTA [facility]   6/22/2017 3:00 PM Nikia Pena PTA [facility]   6/27/2017 3:00 PM Renee Cook PTA [facility]   6/27/2017 3:30 PM Renee Cook PTA [facility]   6/29/2017 3:00 PM Renee Cook PTA [facility]   6/29/2017 3:30 PM Renee Cook PTA [facility]

## 2017-06-06 ENCOUNTER — HOSPITAL ENCOUNTER (OUTPATIENT)
Dept: PHYSICAL THERAPY | Age: 57
Discharge: HOME | End: 2017-06-06
Payer: COMMERCIAL

## 2017-06-06 PROCEDURE — 97112 NEUROMUSCULAR REEDUCATION: CPT

## 2017-06-06 PROCEDURE — 97110 THERAPEUTIC EXERCISES: CPT

## 2017-06-06 NOTE — PROGRESS NOTES
PT DAILY TREATMENT NOTE     Patient Name: Bibi Sebastian  Date:2017  : 1960  [x]  Patient  Verified  Payor: BLUE CROSS / Plan:  [facility] / Product Type: PPO /    In time:2:05  Out time:2:30  Total Treatment Time (min): 25  Visit #: 1 of 6    Treatment Area: Low back pain [M54.5]    SUBJECTIVE  Pain Level (0-10 scale): 1-2/10  Any medication changes, allergies to medications, adverse drug reactions, diagnosis change, or new procedure performed?: [x] No    [] Yes (see summary sheet for update)  Subjective functional status/changes:   [] No changes reported  \"The second day in a row that my back has been feeling good. \"    OBJECTIVE  15 min Therapeutic Exercise:  [] See flow sheet :   Rationale: increase ROM and increase strength to improve the patients ability to perform ADLs without difficulty. 10 min Neuromuscular Re-education:  []  See flow sheet :   Rationale: increase ROM and increase strength  to improve the patients ability to perform ADLs without difficulty. With   [] TE   [] TA   [] neuro   [] other: Patient Education: [x] Review HEP    [] Progressed/Changed HEP based on:   [] positioning   [] body mechanics   [] transfers   [] heat/ice application    [] other:      Other Objective/Functional Measures:      Pain Level (0-10 scale) post treatment: 0/10    ASSESSMENT/Changes in Function: Continued with current ex. Per flow sheet. No p! Was reported during or after therapy. Patient will continue to benefit from skilled PT services to address functional mobility deficits, address ROM deficits and address strength deficits to attain remaining goals. []  See Plan of Care  []  See progress note/recertification  []  See Discharge Summary         Progress towards goals / Updated goals:    1. Pt will report a decrease in at worst pain to 7/10 in the low back to improve tolerance to standing. PN: 8/10   2.  Pt will increase AROM l/s EXT to > 75% of WNL with mild to no increased pain to improve ability to tolerate ADLs. PN: EXT 50-75% of WNL with moderate increased right-sided LBP  3. Pt will be able to take 5 steps after performing a sit to stand transfer from a chair with mild to no increased LBP to improve mobility in the home. PN: reports increased pain \"right away\" in the morning sometimes, other times its after 5-10 steps 6/1/2017  4. Pt will demonstrate WNL pelvic/SIJ alignment for 3 consecutive sessions to improve ease of performing work tasks. PN: to\wo consecutive sessions on 5/23/2017 and 5/25/2017 therapy sessions, but the right innominate was posteriorly rotated and right on left torsion was noted in the sacrum on 6/1/2017 therapy session.    PLAN  [x]  Upgrade activities as tolerated     [x]  Continue plan of care  []  Update interventions per flow sheet       []  Discharge due to:_  []  Other:_      Rubia Rashid PTA 6/6/2017  2:21 PM    Future Appointments  Date Time Provider Neli Ragsdale   6/6/2017 2:30 PM Rubia Rashid PTA [facility]   6/8/2017 2:30 PM Rubia Rashid PTA [facility]   6/8/2017 3:00 PM Rubia Rashid PTA [facility]   6/13/2017 2:30 PM Rubia Rashid PTA [facility]   6/13/2017 3:00 PM Rubia Rashid PTA [facility]   6/15/2017 2:30 PM Rubia Rashid PTA [facility]   6/15/2017 3:00 PM Rubia Rashid PTA [facility]   6/20/2017 2:30 PM Rubia Rashid PTA [facility]   6/20/2017 3:00 PM Rubia Rashid PTA [facility]   6/22/2017 2:30 PM Rubia Rashid PTA [facility]   6/22/2017 3:00 PM Rubia Rashid PTA [facility]   6/27/2017 3:00 PM Rubia Rashid PTA [facility]   6/27/2017 3:30 PM Rubia Rashid PTA [facility]   6/29/2017 3:00 PM Rubia Rashid PTA [facility]   6/29/2017 3:30 PM Rubia Rashid PTA [facility]

## 2017-06-06 NOTE — PROGRESS NOTES
PT DAILY TREATMENT NOTE     Patient Name: Brianne Marr  Date:2017  : 1960  [x]  Patient  Verified  Payor: BLUE CROSS / Plan:  [facility] / Product Type: PPO /    In time:2:30  Out time:3:10  Total Treatment Time (min): 40  Visit #: 4 of 8    Treatment Area: Pain in left shoulder [M25.512]    SUBJECTIVE  Pain Level (0-10 scale): 0/10  Any medication changes, allergies to medications, adverse drug reactions, diagnosis change, or new procedure performed?: [x] No    [] Yes (see summary sheet for update)  Subjective functional status/changes:   [] No changes reported      OBJECTIVE  40 min Therapeutic Exercise:  [] See flow sheet :   Rationale: increase ROM and increase strength to improve the patients ability to perform ADLs without difficulty. With   [] TE   [] TA   [] neuro   [] other: Patient Education: [x] Review HEP    [] Progressed/Changed HEP based on:   [] positioning   [] body mechanics   [] transfers   [] heat/ice application    [] other:      Other Objective/Functional Measures: FOTO 75     Pain Level (0-10 scale) post treatment: 0/10    ASSESSMENT/Changes in Function: Continued with ex. Per flow sheet. Pt reported weakness with ex. But no pain. No p! Was reported after therapy. Patient will continue to benefit from skilled PT services to address functional mobility deficits, address ROM deficits, address strength deficits and analyze and address soft tissue restrictions to attain remaining goals. []  See Plan of Care  []  See progress note/recertification  []  See Discharge Summary         Progress towards goals / Updated goals:  1. Pt will increase AROM left shoulder flex to [facility] to improve ability to progress through protocol with more ease. PN: 140 degs  Current: MET Left shoulder AROM flexion 175 deg. 2. Pt will improve left shoulder flex MMT to 3/5 to improve ease of performing functional tasks.    PN: not assessed but pt continues to have limited AROM left shoulder flex  Current: continues to report fatigue with s/l left shoulder flex to 90 degs and ER exercise with 1# weight 6/1/2017  3. Pt will understand and be independent with final HEP when d/c'ed from therapy to improve pt's ability to tolerate ADLs around his home with more independence.    PN: will create/finalize pt's final HEP when approaching d/c     PLAN  [x]  Upgrade activities as tolerated     [x]  Continue plan of care  []  Update interventions per flow sheet       []  Discharge due to:_  []  Other:_      Jackie Christina PTA 6/6/2017  2:50 PM    Future Appointments  Date Time Provider Neli Ragsdale   6/8/2017 2:30 PM Jackie Christina PTA [facility]   6/8/2017 3:00 PM Jackie Christina PTA [facility]   6/13/2017 2:30 PM Jackie Christina PTA [facility]   6/13/2017 3:00 PM Jackie Christina, ERIN [facility]   6/15/2017 2:30 PM Jackie Christina PTA [facility]   6/15/2017 3:00 PM Jackie Christina PTA [facility]   6/20/2017 2:30 PM Jackie Christina PTA [facility]   6/20/2017 3:00 PM Jackie Christina PTA [facility]   6/22/2017 2:30 PM Jackie Christina PTA [facility]   6/22/2017 3:00 PM Jackie Christina PTA [facility]   6/27/2017 3:00 PM Jackie Christina PTA [facility]   6/27/2017 3:30 PM Jackie Christina PTA [facility]   6/29/2017 3:00 PM Jackie Christina PTA [facility]   6/29/2017 3:30 PM Jackie Christina PTA [facility]

## 2017-06-08 ENCOUNTER — HOSPITAL ENCOUNTER (OUTPATIENT)
Dept: PHYSICAL THERAPY | Age: 57
Discharge: HOME | End: 2017-06-08
Payer: COMMERCIAL

## 2017-06-08 PROCEDURE — 97110 THERAPEUTIC EXERCISES: CPT

## 2017-06-08 PROCEDURE — 97112 NEUROMUSCULAR REEDUCATION: CPT

## 2017-06-08 NOTE — PROGRESS NOTES
PT DAILY TREATMENT NOTE     Patient Name: Juan Trinidad  Date:2017  : 1960  [x]  Patient  Verified  Payor: BLUE CROSS / Plan:  [facility] / Product Type: PPO /    In time:3:00  Out time:3:40  Total Treatment Time (min): 40  Visit #: 5 of 8    Treatment Area: Pain in left shoulder [M25.512]    SUBJECTIVE  Pain Level (0-10 scale): 0/10  Any medication changes, allergies to medications, adverse drug reactions, diagnosis change, or new procedure performed?: [x] No    [] Yes (see summary sheet for update)  Subjective functional status/changes:   [] No changes reported  \"I don't feel my shoulder. \"    OBJECTIVE    40 min Therapeutic Exercise:  [] See flow sheet :   Rationale: increase strength to improve the patients ability to perform ADLs without difficulty. With   [] TE   [] TA   [] neuro   [] other: Patient Education: [x] Review HEP    [] Progressed/Changed HEP based on:   [] positioning   [] body mechanics   [] transfers   [] heat/ice application    [] other:      Other Objective/Functional Measures: Functional Gains: \"Pain, ROM, strength\"  Functional Deficits: \"Strength, ROM\"  % improvement: 70-80%  Pain   Average: 0/10       Best: 0/10     Worst: 0/10  Pain Level (0-10 scale) post treatment: 0/10    ASSESSMENT/Changes in Function: Continued with current ex. Per flow sheet. No p! Was reported during or after therapy, just weakness. []  See Plan of Care  []  See progress note/recertification  []  See Discharge Summary         Progress towards goals / Updated goals:  1. Pt will increase AROM left shoulder flex to [facility] to improve ability to progress through protocol with more ease. PN: 140 degs  Current: MET Left shoulder AROM flexion 175 deg. 2. Pt will improve left shoulder flex MMT to 3/5 to improve ease of performing functional tasks.    PN: not assessed but pt continues to have limited AROM left shoulder flex  Current: MET.  left shoulder flex MMT 3+/5.  3. Pt will understand and be independent with final HEP when d/c'ed from therapy to improve pt's ability to tolerate ADLs around his home with more independence. PN: will create/finalize pt's final HEP when approaching d/c   Current: MET. PLAN  []  Upgrade activities as tolerated     []  Continue plan of care  []  Update interventions per flow sheet       [x]  Discharge due to:_independent with ex.  Program.  []  Other:_      Brett Singh PTA 6/8/2017  3:18 PM    Future Appointments  Date Time Provider Neli Ragsdale   6/13/2017 2:30 PM Aledhruv Garciating, PTA [facility]   6/13/2017 3:00 PM Aleatha Joseting, PTA [facility]   6/15/2017 2:30 PM Aleatha Bunting, PTA [facility]   6/15/2017 3:00 PM Aleatha Bunting, PTA [facility]   6/20/2017 2:30 PM Aleatha Bunting, PTA [facility]   6/20/2017 3:00 PM Aleatha Bunting, PTA [facility]   6/22/2017 2:30 PM Aleatha Bunting, PTA [facility]   6/22/2017 3:00 PM Aleatha Bunting, PTA [facility]   6/27/2017 3:00 PM Aleatha Bunting, PTA [facility]   6/27/2017 3:30 PM Aleatha Bunting, PTA [facility]   6/29/2017 3:00 PM Brett Singh PTA [facility]   6/29/2017 3:30 PM Brett Singh PTA [facility]

## 2017-06-13 ENCOUNTER — APPOINTMENT (OUTPATIENT)
Dept: PHYSICAL THERAPY | Age: 57
End: 2017-06-13
Payer: COMMERCIAL

## 2017-06-15 ENCOUNTER — APPOINTMENT (OUTPATIENT)
Dept: PHYSICAL THERAPY | Age: 57
End: 2017-06-15
Payer: COMMERCIAL

## 2017-06-16 NOTE — PROGRESS NOTES
[facility] [address]  [address]  Phone: [phone]      Fax:  [phone]    Discharge Summary    Patient name: Hulen Severe Start of Care: 2017   Referral source: Cindy Villagomez MD : 1960    Medical Diagnosis: Pain in left shoulder [M25.512] Onset Date:DOS 2017    Treatment Diagnosis: pain in left shoulder s/p left shoulder arthroscope SAD, RCR   Prior Hospitalization: see medical history Provider#: [id number]   Medications: Verified on Patient summary List   Comorbidities: right RCR repair 3/8/2016, left TKR , HTN, arthritis  Prior Level of Function: Independent with ADLs, functional, and work tasks with pain in the left shoulder before the surgery. Visits from Start of Care: 26    Missed Visits: 5  Reporting Period : 2017 to 2017    Goal:Pt will increase AROM left shoulder flex to [facility] to improve ability to progress through protocol with more ease. Status at last note/certification:MET Left shoulder AROM flexion 175 deg. Status at discharge: met    Goal:Pt will improve left shoulder flex MMT to 3/5 to improve ease of performing functional tasks. Status at last note/certification:MET.  left shoulder flex MMT 3+/5  Status at discharge: met    Goal:Pt will understand and be independent with final HEP when d/c'ed from therapy to improve pt's ability to tolerate ADLs around his home with more independence  Status at last note/certification: MET  Status at discharge: met    Functional Gains: \"Pain, ROM, strength\"  Functional Deficits: \"Strength, ROM\"  % improvement: 70-80%  Pain Average: 0/10  Best: 0/10  Worst: 0/10    Pain Level (0-10 scale) post treatment: 0/10    Assessment/ Summary of Care:   Pt was seen for 26 therapy sessions. Pt demonstrated improvements in pain, ROM, and strength since starting therapy. Pt reports 70-80% improvement in symptoms since start of care.  Pt is d/c'ed from therapy at this time to HEP secondary to meeting goals above and ability to independently perform HEP to manage current deficits.      RECOMMENDATIONS:  [x]Discontinue therapy: [x]Patient has reached or is progressing toward set goals      []Patient is non-compliant or has abdicated      []Due to lack of appreciable progress towards set goals    Ivy Mclain, PT 6/16/2017 9:57 AM

## 2017-06-20 ENCOUNTER — APPOINTMENT (OUTPATIENT)
Dept: PHYSICAL THERAPY | Age: 57
End: 2017-06-20
Payer: COMMERCIAL

## 2017-06-22 ENCOUNTER — APPOINTMENT (OUTPATIENT)
Dept: PHYSICAL THERAPY | Age: 57
End: 2017-06-22
Payer: COMMERCIAL

## 2017-06-27 ENCOUNTER — APPOINTMENT (OUTPATIENT)
Dept: PHYSICAL THERAPY | Age: 57
End: 2017-06-27
Payer: COMMERCIAL

## 2017-06-29 ENCOUNTER — APPOINTMENT (OUTPATIENT)
Dept: PHYSICAL THERAPY | Age: 57
End: 2017-06-29
Payer: COMMERCIAL

## 2017-07-12 NOTE — PROGRESS NOTES
[facility] at [address] [facility], [address] [place] [address]  Phone: [phone]      Fax:  [phone]    Discharge Summary    Patient name: Audelia Garg Start of Care: 2017   Referral source: Kym Hernandez MD : 1960    Medical Diagnosis: Low back pain [M54.5] Onset Date: worsening 2017    Treatment Diagnosis: LBP, right SIJ pain   Prior Hospitalization: see medical history Provider#: [id number]   Medications: Verified on Patient summary List   Comorbidities: right RCR repair 3/8/2016, left TKR , HTN, arthritis, left RTC repair DOS 2017  Prior Level of Function: Independent with ADLs, functional, and work tasks with pain in the left shoulder before the surgery. Visits from Start of Care: 7    Missed Visits: 4  Reporting Period : 2017 to 2017    Assessment/ Summary of Care:   Pt was seen for 7 therapy sessions. The pt's last scheduled therapy appointment was on 2017. Per telephone log on 2017, pt cancelled his upcoming therapy appointments but was not d/c'ed. Per telephone log, pt was contacted on 7/10/2017 and again on 2017 and stated that he is ready for d/c. Pt is therefore d/c'ed from therapy and unable to reassess goals at this time.      RECOMMENDATIONS:  [x]Discontinue therapy: []Patient has reached or is progressing toward set goals      [x]Patient is non-compliant or has abdicated      []Due to lack of appreciable progress towards set goals    Lisa Madrigal, PT 2017 1:13 PM

## 2024-09-24 ENCOUNTER — HOSPITAL ENCOUNTER (OUTPATIENT)
Facility: HOSPITAL | Age: 64
Setting detail: SPECIMEN
Discharge: HOME | End: 2024-09-27

## 2024-09-24 LAB
APPEARANCE UR: CLEAR
BACTERIA URNS QL MICRO: NEGATIVE /HPF
BILIRUB UR QL: NEGATIVE
COLOR UR: NORMAL
EPITH CASTS URNS QL MICRO: NORMAL /LPF
GLUCOSE UR STRIP.AUTO-MCNC: NEGATIVE MG/DL
HGB UR QL STRIP: NEGATIVE
KETONES UR QL STRIP.AUTO: NEGATIVE MG/DL
LEUKOCYTE ESTERASE UR QL STRIP.AUTO: NEGATIVE
MUCOUS THREADS URNS QL MICRO: NEGATIVE /LPF
NITRITE UR QL STRIP.AUTO: NEGATIVE
PH UR STRIP: 5 (ref 5–8)
PROT UR STRIP-MCNC: NEGATIVE MG/DL
RBC #/AREA URNS HPF: NORMAL /HPF (ref 0–5)
SP GR UR REFRACTOMETRY: 1.02 (ref 1–1.03)
URINE CULTURE IF INDICATED: NORMAL
UROBILINOGEN UR QL STRIP.AUTO: 0.1 EU/DL (ref 0.1–1)
WBC URNS QL MICRO: NORMAL /HPF (ref 0–4)

## 2024-09-24 PROCEDURE — 81001 URINALYSIS AUTO W/SCOPE: CPT
